# Patient Record
Sex: FEMALE | Race: WHITE | NOT HISPANIC OR LATINO | Employment: OTHER | URBAN - METROPOLITAN AREA
[De-identification: names, ages, dates, MRNs, and addresses within clinical notes are randomized per-mention and may not be internally consistent; named-entity substitution may affect disease eponyms.]

---

## 2017-01-17 ENCOUNTER — TRANSCRIBE ORDERS (OUTPATIENT)
Dept: MAMMOGRAPHY | Facility: HOSPITAL | Age: 62
End: 2017-01-17

## 2017-01-17 DIAGNOSIS — M81.0 OSTEOPOROSIS, UNSPECIFIED: Primary | ICD-10-CM

## 2017-01-17 DIAGNOSIS — Z12.31 VISIT FOR SCREENING MAMMOGRAM: ICD-10-CM

## 2017-02-21 ENCOUNTER — HOSPITAL ENCOUNTER (OUTPATIENT)
Dept: MAMMOGRAPHY | Facility: HOSPITAL | Age: 62
Discharge: HOME | End: 2017-02-21
Admitting: FAMILY MEDICINE

## 2017-02-21 ENCOUNTER — HOSPITAL ENCOUNTER (OUTPATIENT)
Dept: BONE DENSITY | Facility: HOSPITAL | Age: 62
Discharge: HOME | End: 2017-02-21

## 2017-02-21 DIAGNOSIS — Z12.31 VISIT FOR SCREENING MAMMOGRAM: ICD-10-CM

## 2017-02-21 DIAGNOSIS — M81.0 OSTEOPOROSIS, UNSPECIFIED: ICD-10-CM

## 2017-02-21 PROCEDURE — 77063 BREAST TOMOSYNTHESIS BI: CPT | Performed by: RADIOLOGY

## 2017-02-21 PROCEDURE — 77080 DXA BONE DENSITY AXIAL: CPT | Performed by: RADIOLOGY

## 2017-02-21 PROCEDURE — 77063 BREAST TOMOSYNTHESIS BI: CPT

## 2017-02-21 PROCEDURE — G0202 SCR MAMMO BI INCL CAD: HCPCS

## 2017-02-21 PROCEDURE — 77067 SCR MAMMO BI INCL CAD: CPT | Performed by: RADIOLOGY

## 2017-02-21 PROCEDURE — 77080 DXA BONE DENSITY AXIAL: CPT

## 2018-08-03 ENCOUNTER — TRANSCRIBE ORDERS (OUTPATIENT)
Dept: ADMINISTRATIVE | Facility: HOSPITAL | Age: 63
End: 2018-08-03

## 2018-08-03 DIAGNOSIS — Z12.31 VISIT FOR SCREENING MAMMOGRAM: Primary | ICD-10-CM

## 2018-08-22 ENCOUNTER — HOSPITAL ENCOUNTER (OUTPATIENT)
Dept: MAMMOGRAPHY | Facility: HOSPITAL | Age: 63
Discharge: HOME | End: 2018-08-22
Attending: FAMILY MEDICINE | Admitting: FAMILY MEDICINE

## 2018-08-22 DIAGNOSIS — Z12.31 VISIT FOR SCREENING MAMMOGRAM: ICD-10-CM

## 2018-08-22 PROCEDURE — 77063 BREAST TOMOSYNTHESIS BI: CPT

## 2018-08-22 PROCEDURE — 77067 SCR MAMMO BI INCL CAD: CPT

## 2018-08-22 PROCEDURE — 77067 SCR MAMMO BI INCL CAD: CPT | Performed by: RADIOLOGY

## 2018-08-22 PROCEDURE — 77063 BREAST TOMOSYNTHESIS BI: CPT | Performed by: RADIOLOGY

## 2018-12-27 ENCOUNTER — TRANSCRIBE ORDERS (OUTPATIENT)
Dept: ADMINISTRATIVE | Facility: HOSPITAL | Age: 63
End: 2018-12-27

## 2018-12-27 DIAGNOSIS — E04.9 ENLARGED THYROID: Primary | ICD-10-CM

## 2018-12-28 ENCOUNTER — HOSPITAL ENCOUNTER (OUTPATIENT)
Dept: ULTRASOUND IMAGING | Facility: HOSPITAL | Age: 63
Discharge: HOME | End: 2018-12-28
Admitting: NURSE PRACTITIONER

## 2018-12-28 DIAGNOSIS — E04.9 ENLARGED THYROID: ICD-10-CM

## 2018-12-28 PROCEDURE — 76536 US EXAM OF HEAD AND NECK: CPT

## 2019-03-19 ENCOUNTER — ANESTHESIA EVENT (OUTPATIENT)
Dept: PERIOP | Facility: HOSPITAL | Age: 64
End: 2019-03-19

## 2019-03-19 ENCOUNTER — OFFICE VISIT (OUTPATIENT)
Dept: ORTHOPEDIC SURGERY | Facility: CLINIC | Age: 64
End: 2019-03-19

## 2019-03-19 ENCOUNTER — APPOINTMENT (OUTPATIENT)
Dept: PREADMISSION TESTING | Facility: HOSPITAL | Age: 64
End: 2019-03-19

## 2019-03-19 VITALS — HEART RATE: 73 BPM | WEIGHT: 135 LBS | BODY MASS INDEX: 23.05 KG/M2 | HEIGHT: 64 IN | OXYGEN SATURATION: 99 %

## 2019-03-19 VITALS — WEIGHT: 137 LBS | BODY MASS INDEX: 23.39 KG/M2 | HEIGHT: 64 IN

## 2019-03-19 DIAGNOSIS — M25.572 LEFT ANKLE PAIN, UNSPECIFIED CHRONICITY: ICD-10-CM

## 2019-03-19 DIAGNOSIS — S82.853A CLOSED TRIMALLEOLAR FRACTURE OF ANKLE WITH HIGH FIBULAR FRACTURE: ICD-10-CM

## 2019-03-19 DIAGNOSIS — S82.839A CLOSED TRIMALLEOLAR FRACTURE OF ANKLE WITH HIGH FIBULAR FRACTURE: Primary | ICD-10-CM

## 2019-03-19 DIAGNOSIS — S82.853A CLOSED TRIMALLEOLAR FRACTURE OF ANKLE WITH HIGH FIBULAR FRACTURE: Primary | ICD-10-CM

## 2019-03-19 DIAGNOSIS — S82.839A CLOSED TRIMALLEOLAR FRACTURE OF ANKLE WITH HIGH FIBULAR FRACTURE: ICD-10-CM

## 2019-03-19 LAB
ANION GAP SERPL CALCULATED.3IONS-SCNC: 10 MMOL/L (ref 3–11)
APTT PPP: 30 SECONDS (ref 24–37)
BASOPHILS # BLD AUTO: 0.03 10*3/MM3 (ref 0–0.2)
BASOPHILS NFR BLD AUTO: 0.5 % (ref 0–1)
BUN BLD-MCNC: 26 MG/DL (ref 9–23)
BUN/CREAT SERPL: 33.8 (ref 7–25)
CALCIUM SPEC-SCNC: 9.6 MG/DL (ref 8.7–10.4)
CHLORIDE SERPL-SCNC: 106 MMOL/L (ref 99–109)
CO2 SERPL-SCNC: 27 MMOL/L (ref 20–31)
CREAT BLD-MCNC: 0.77 MG/DL (ref 0.6–1.3)
DEPRECATED RDW RBC AUTO: 46 FL (ref 37–54)
EOSINOPHIL # BLD AUTO: 0.09 10*3/MM3 (ref 0–0.3)
EOSINOPHIL NFR BLD AUTO: 1.4 % (ref 0–3)
ERYTHROCYTE [DISTWIDTH] IN BLOOD BY AUTOMATED COUNT: 14 % (ref 11.3–14.5)
GFR SERPL CREATININE-BSD FRML MDRD: 75 ML/MIN/1.73
GLUCOSE BLD-MCNC: 124 MG/DL (ref 70–100)
HCT VFR BLD AUTO: 38.6 % (ref 34.5–44)
HGB BLD-MCNC: 13 G/DL (ref 11.5–15.5)
IMM GRANULOCYTES # BLD AUTO: 0.02 10*3/MM3 (ref 0–0.05)
IMM GRANULOCYTES NFR BLD AUTO: 0.3 % (ref 0–0.6)
INR PPP: 0.97 (ref 0.85–1.16)
LYMPHOCYTES # BLD AUTO: 1.04 10*3/MM3 (ref 0.6–4.8)
LYMPHOCYTES NFR BLD AUTO: 16.4 % (ref 24–44)
MCH RBC QN AUTO: 30.4 PG (ref 27–31)
MCHC RBC AUTO-ENTMCNC: 33.7 G/DL (ref 32–36)
MCV RBC AUTO: 90.2 FL (ref 80–99)
MONOCYTES # BLD AUTO: 0.64 10*3/MM3 (ref 0–1)
MONOCYTES NFR BLD AUTO: 10.1 % (ref 0–12)
NEUTROPHILS # BLD AUTO: 4.54 10*3/MM3 (ref 1.5–8.3)
NEUTROPHILS NFR BLD AUTO: 71.3 % (ref 41–71)
PLATELET # BLD AUTO: 266 10*3/MM3 (ref 150–450)
PMV BLD AUTO: 8.5 FL (ref 6–12)
POTASSIUM BLD-SCNC: 3.9 MMOL/L (ref 3.5–5.5)
PROTHROMBIN TIME: 12.4 SECONDS (ref 11.2–14.3)
RBC # BLD AUTO: 4.28 10*6/MM3 (ref 3.89–5.14)
SODIUM BLD-SCNC: 143 MMOL/L (ref 132–146)
WBC NRBC COR # BLD: 6.36 10*3/MM3 (ref 3.5–10.8)

## 2019-03-19 PROCEDURE — 80048 BASIC METABOLIC PNL TOTAL CA: CPT | Performed by: ORTHOPAEDIC SURGERY

## 2019-03-19 PROCEDURE — 85730 THROMBOPLASTIN TIME PARTIAL: CPT | Performed by: ORTHOPAEDIC SURGERY

## 2019-03-19 PROCEDURE — 93005 ELECTROCARDIOGRAM TRACING: CPT

## 2019-03-19 PROCEDURE — 85025 COMPLETE CBC W/AUTO DIFF WBC: CPT | Performed by: ORTHOPAEDIC SURGERY

## 2019-03-19 PROCEDURE — 99204 OFFICE O/P NEW MOD 45 MIN: CPT | Performed by: ORTHOPAEDIC SURGERY

## 2019-03-19 PROCEDURE — 93010 ELECTROCARDIOGRAM REPORT: CPT | Performed by: INTERNAL MEDICINE

## 2019-03-19 PROCEDURE — 85610 PROTHROMBIN TIME: CPT | Performed by: ORTHOPAEDIC SURGERY

## 2019-03-19 PROCEDURE — 36415 COLL VENOUS BLD VENIPUNCTURE: CPT

## 2019-03-19 RX ORDER — LISINOPRIL 5 MG/1
TABLET ORAL
Refills: 1 | COMMUNITY
Start: 2019-02-11

## 2019-03-19 RX ORDER — ACETAMINOPHEN 325 MG/1
1000 TABLET ORAL ONCE
Status: CANCELLED | OUTPATIENT
Start: 2019-03-19 | End: 2019-03-19

## 2019-03-19 RX ORDER — FAMOTIDINE 10 MG/ML
20 INJECTION, SOLUTION INTRAVENOUS ONCE
Status: CANCELLED | OUTPATIENT
Start: 2019-03-19 | End: 2019-03-19

## 2019-03-19 RX ORDER — MELOXICAM 15 MG/1
15 TABLET ORAL EVERY MORNING
Refills: 3 | COMMUNITY
Start: 2019-03-06 | End: 2019-05-24

## 2019-03-19 RX ORDER — FLUOXETINE HYDROCHLORIDE 40 MG/1
CAPSULE ORAL
Refills: 1 | COMMUNITY
Start: 2019-02-10 | End: 2019-06-10

## 2019-03-19 RX ORDER — LEVOTHYROXINE SODIUM 0.05 MG/1
TABLET ORAL
Refills: 1 | COMMUNITY
Start: 2019-02-11 | End: 2019-12-16 | Stop reason: DRUGHIGH

## 2019-03-19 RX ORDER — ZOLPIDEM TARTRATE 10 MG/1
5 TABLET ORAL
Refills: 2 | COMMUNITY
Start: 2019-01-19

## 2019-03-19 RX ORDER — PRAVASTATIN SODIUM 20 MG
20 TABLET ORAL
Refills: 1 | COMMUNITY
Start: 2019-03-10

## 2019-03-19 NOTE — PROGRESS NOTES
Orthopaedic Clinic Note: ER New Patient    Chief Complaint   Patient presents with   • Left Ankle - Pain        HPI    Merry Tucker is a 63 y.o. female who presents with left ankle pain for 5 day(s). Onset began when she sustained a mechanical fall while in [place] for the [employer] men[employer] [place].  She was walking across the street and misstepped resulting in a sharp pain in her ankle.  She was unable to ambulate afterwards.  She was seen at [facility] and diagnosed with a trimalleolar ankle fracture.  She was instructed to follow-up with an orthopedic surgeon locally for evaluation and treatment.  She denies any fevers chills or constitutional symptoms.  She has been nonweightbearing as instructed.  She rates her pain a 1/10 on the pain scale currently and describes it as a constant ache localized to the ankle joint.    Past Medical History:   Diagnosis Date   • Anxiety    • High cholesterol    • Hypertension    • Hypothyroid       Past Surgical History:   Procedure Laterality Date   • ANKLE OPEN REDUCTION INTERNAL FIXATION Right 2003    orif   • AUGMENTATION MAMMAPLASTY Bilateral 1987    silicone       Family History   Problem Relation Age of Onset   • Cancer Mother    • Hypertension Mother    • Cancer Father    • Hypertension Father    • Heart attack Father    • Breast cancer Neg Hx    • Ovarian cancer Neg Hx      Social History     Socioeconomic History   • Marital status:      Spouse name: Not on file   • Number of children: Not on file   • Years of education: Not on file   • Highest education level: Not on file   Social Needs   • Financial resource strain: Not on file   • Food insecurity - worry: Not on file   • Food insecurity - inability: Not on file   • Transportation needs - medical: Not on file   • Transportation needs - non-medical: Not on file   Occupational History   • Not on file   Tobacco Use   • Smoking status: Never Smoker   • Smokeless tobacco: Never Used   Substance and  "Sexual Activity   • Alcohol use: Yes     Comment: social   • Drug use: No   • Sexual activity: Defer   Other Topics Concern   • Not on file   Social History Narrative   • Not on file      Current Outpatient Medications on File Prior to Visit   Medication Sig Dispense Refill   • CALCIUM CITRATE-VITAMIN D PO Take 1,000 mg by mouth.     • Cholecalciferol (VITAMIN D PO) Take  by mouth.     • FLUoxetine (PROzac) 40 MG capsule TAKE 1 CAPSULE BY MOUTH ONE TIME A DAY  1   • levothyroxine (SYNTHROID, LEVOTHROID) 50 MCG tablet TAKE 1 TABLET BY MOUTH IN THE MORNING on an empty stomach. do not eat for 30 minutes  1   • lisinopril (PRINIVIL,ZESTRIL) 5 MG tablet TAKE 1 TABLET BY MOUTH IN THE MORNING FOR BLOOD PRESSURE  1   • meloxicam (MOBIC) 15 MG tablet Take 15 mg by mouth Every Morning.  3   • pravastatin (PRAVACHOL) 20 MG tablet Take 20 mg by mouth every night at bedtime.  1   • zolpidem (AMBIEN) 10 MG tablet Take 5 mg by mouth every night at bedtime.  2     No current facility-administered medications on file prior to visit.       Allergies   Allergen Reactions   • Penicillins Rash        Review of Systems   Constitutional: Negative.    HENT: Positive for postnasal drip.    Eyes: Negative.    Respiratory: Negative.    Cardiovascular: Negative.    Gastrointestinal: Positive for constipation.   Endocrine: Negative.    Genitourinary: Negative.    Musculoskeletal: Positive for arthralgias and joint swelling.   Skin: Negative.    Allergic/Immunologic: Negative.    Neurological: Negative.    Hematological: Negative.    Psychiatric/Behavioral: Positive for decreased concentration and sleep disturbance. The patient is nervous/anxious.         The following portions of the patient's history were reviewed and updated as appropriate: allergies, current medications, past family history, past medical history, past social history, past surgical history and problem list.    Physical Exam  Pulse 73, height 162.6 cm (64.02\"), weight 61.2 kg " (135 lb), SpO2 99 %, not currently breastfeeding.    Body mass index is 23.16 kg/m².    GENERAL APPEARANCE: awake, alert & oriented x 3, in no acute distress and well developed, well nourished  PSYCH: normal affect  LUNGS:  breathing nonlabored  EYES: sclera anicteric  CARDIOVASCULAR: palpable dorsalis pedis, palpable posterior tibial bilaterally. Capillary refill less than 2 seconds  EXTREMITIES: no clubbing, cyanosis  GAIT: Not assessed            Left Lower Extremity Exam:    Left lower extremity short leg splint is clean, dry and intact.  Upon removal of the soft roll, there is focal swelling globally throughout the ankle with resolving ecchymosis.  She is focally tender to palpation globally throughout the ankle.  No open wounds or fracture blisters identified.  Ankle range of motion is limited secondary to pain.   Intact EHL, FHL, tibialis anterior, and gastrocsoleus. Sensation intact to light touch to deep peroneal, superficial peroneal, sural, saphenous, tibial nerves. Palpable DP and PT pulses. Edema -mild ankle swelling.    ______________________________________________________________________  ______________________________________________________________________    RADIOGRAPHIC FINDINGS:   Indication: Left ankle fracture    Comparison: Todays xrays were compared to previous xrays from [facility] from 3/14/2019     Left ankle 3 views: Radiographs demonstrate a mildly displaced trimalleolar ankle fracture was suspected syndesmotic injury      Assessment/Plan:   Diagnosis Plan   1. Closed trimalleolar fracture of ankle with high fibular fracture  Case Request    ceFAZolin (ANCEF) 2 g in sodium chloride 0.9 % 100 mL IVPB    acetaminophen (TYLENOL) tablet 975 mg    CBC and Differential    Basic metabolic panel    Protime-INR    APTT    Case Request   2. Left ankle pain, unspecified chronicity  XR Ankle 3+ View Left     The patient has clinical and radiographic evidence of unstable left ankle  trimalleolar ankle fracture which is severely restricting the patient's activities as well as quality of life. The recommendation at this time is to proceed with a open reduction internal fixation of left trimalleolar ankle fracture with the goal to improve patient function, decrease pain, and improve mobility. The risks, benefits, potential complications, and alternatives were discussed with the patient in detail. Risks included but were not limited to bleeding, infection, anesthesia risks, damage to neurovascular structures, nonunion, malunion, hardware failure, ankle arthritis, pain, continued pain, iatrogenic fracture, possible need for future surgery including the potential for amputation, blood clots, myocardial infarction, stroke, and death. Tessie-operative blood management and the potential for blood transfusion were discussed with risks and options clearly outlined. Specific details of the surgical procedure, hospitalization, recovery, rehabilitation, and long-term precautions were also presented. Pre-operative teaching was provided. Implant/prosthesis and equipment selection was outlined, and the many options available were explained; the final choice will be made at the time of the procedure to match the anatomy and condition of the bone, ligaments, tendons, and muscles. Given this instruction, the patient elected to proceed with the open reduction internal fixation of left ankle fracture. The patient will be seen by pre-admission testing for pre-operative optimization and risk assessment and will be scheduled for surgery once this is completed.      Ole Dominguez MD  03/19/19  8:13 AM

## 2019-03-19 NOTE — H&P (VIEW-ONLY)
Orthopaedic Clinic Note: ER New Patient    Chief Complaint   Patient presents with   • Left Ankle - Pain        HPI    Merry Tucker is a 63 y.o. female who presents with left ankle pain for 5 day(s). Onset began when she sustained a mechanical fall while in [place] for the [employer] men[place] basketball tournament.  She was walking across the street and misstepped resulting in a sharp pain in her ankle.  She was unable to ambulate afterwards.  She was seen at [facility] and diagnosed with a trimalleolar ankle fracture.  She was instructed to follow-up with an orthopedic surgeon locally for evaluation and treatment.  She denies any fevers chills or constitutional symptoms.  She has been nonweightbearing as instructed.  She rates her pain a 1/10 on the pain scale currently and describes it as a constant ache localized to the ankle joint.    Past Medical History:   Diagnosis Date   • Anxiety    • High cholesterol    • Hypertension    • Hypothyroid       Past Surgical History:   Procedure Laterality Date   • ANKLE OPEN REDUCTION INTERNAL FIXATION Right 2003    orif   • AUGMENTATION MAMMAPLASTY Bilateral 1987    silicone       Family History   Problem Relation Age of Onset   • Cancer Mother    • Hypertension Mother    • Cancer Father    • Hypertension Father    • Heart attack Father    • Breast cancer Neg Hx    • Ovarian cancer Neg Hx      Social History     Socioeconomic History   • Marital status:      Spouse name: Not on file   • Number of children: Not on file   • Years of education: Not on file   • Highest education level: Not on file   Social Needs   • Financial resource strain: Not on file   • Food insecurity - worry: Not on file   • Food insecurity - inability: Not on file   • Transportation needs - medical: Not on file   • Transportation needs - non-medical: Not on file   Occupational History   • Not on file   Tobacco Use   • Smoking status: Never Smoker   • Smokeless tobacco: Never Used   Substance and  "Sexual Activity   • Alcohol use: Yes     Comment: social   • Drug use: No   • Sexual activity: Defer   Other Topics Concern   • Not on file   Social History Narrative   • Not on file      Current Outpatient Medications on File Prior to Visit   Medication Sig Dispense Refill   • CALCIUM CITRATE-VITAMIN D PO Take 1,000 mg by mouth.     • Cholecalciferol (VITAMIN D PO) Take  by mouth.     • FLUoxetine (PROzac) 40 MG capsule TAKE 1 CAPSULE BY MOUTH ONE TIME A DAY  1   • levothyroxine (SYNTHROID, LEVOTHROID) 50 MCG tablet TAKE 1 TABLET BY MOUTH IN THE MORNING on an empty stomach. do not eat for 30 minutes  1   • lisinopril (PRINIVIL,ZESTRIL) 5 MG tablet TAKE 1 TABLET BY MOUTH IN THE MORNING FOR BLOOD PRESSURE  1   • meloxicam (MOBIC) 15 MG tablet Take 15 mg by mouth Every Morning.  3   • pravastatin (PRAVACHOL) 20 MG tablet Take 20 mg by mouth every night at bedtime.  1   • zolpidem (AMBIEN) 10 MG tablet Take 5 mg by mouth every night at bedtime.  2     No current facility-administered medications on file prior to visit.       Allergies   Allergen Reactions   • Penicillins Rash        Review of Systems   Constitutional: Negative.    HENT: Positive for postnasal drip.    Eyes: Negative.    Respiratory: Negative.    Cardiovascular: Negative.    Gastrointestinal: Positive for constipation.   Endocrine: Negative.    Genitourinary: Negative.    Musculoskeletal: Positive for arthralgias and joint swelling.   Skin: Negative.    Allergic/Immunologic: Negative.    Neurological: Negative.    Hematological: Negative.    Psychiatric/Behavioral: Positive for decreased concentration and sleep disturbance. The patient is nervous/anxious.         The following portions of the patient's history were reviewed and updated as appropriate: allergies, current medications, past family history, past medical history, past social history, past surgical history and problem list.    Physical Exam  Pulse 73, height 162.6 cm (64.02\"), weight 61.2 kg " (135 lb), SpO2 99 %, not currently breastfeeding.    Body mass index is 23.16 kg/m².    GENERAL APPEARANCE: awake, alert & oriented x 3, in no acute distress and well developed, well nourished  PSYCH: normal affect  LUNGS:  breathing nonlabored  EYES: sclera anicteric  CARDIOVASCULAR: palpable dorsalis pedis, palpable posterior tibial bilaterally. Capillary refill less than 2 seconds  EXTREMITIES: no clubbing, cyanosis  GAIT: Not assessed            Left Lower Extremity Exam:    Left lower extremity short leg splint is clean, dry and intact.  Upon removal of the soft roll, there is focal swelling globally throughout the ankle with resolving ecchymosis.  She is focally tender to palpation globally throughout the ankle.  No open wounds or fracture blisters identified.  Ankle range of motion is limited secondary to pain.   Intact EHL, FHL, tibialis anterior, and gastrocsoleus. Sensation intact to light touch to deep peroneal, superficial peroneal, sural, saphenous, tibial nerves. Palpable DP and PT pulses. Edema -mild ankle swelling.    ______________________________________________________________________  ______________________________________________________________________    RADIOGRAPHIC FINDINGS:   Indication: Left ankle fracture    Comparison: Todays xrays were compared to previous xrays from [facility] from 3/14/2019     Left ankle 3 views: Radiographs demonstrate a mildly displaced trimalleolar ankle fracture was suspected syndesmotic injury      Assessment/Plan:   Diagnosis Plan   1. Closed trimalleolar fracture of ankle with high fibular fracture  Case Request    ceFAZolin (ANCEF) 2 g in sodium chloride 0.9 % 100 mL IVPB    acetaminophen (TYLENOL) tablet 975 mg    CBC and Differential    Basic metabolic panel    Protime-INR    APTT    Case Request   2. Left ankle pain, unspecified chronicity  XR Ankle 3+ View Left     The patient has clinical and radiographic evidence of unstable left ankle  trimalleolar ankle fracture which is severely restricting the patient's activities as well as quality of life. The recommendation at this time is to proceed with a open reduction internal fixation of left trimalleolar ankle fracture with the goal to improve patient function, decrease pain, and improve mobility. The risks, benefits, potential complications, and alternatives were discussed with the patient in detail. Risks included but were not limited to bleeding, infection, anesthesia risks, damage to neurovascular structures, nonunion, malunion, hardware failure, ankle arthritis, pain, continued pain, iatrogenic fracture, possible need for future surgery including the potential for amputation, blood clots, myocardial infarction, stroke, and death. Tessie-operative blood management and the potential for blood transfusion were discussed with risks and options clearly outlined. Specific details of the surgical procedure, hospitalization, recovery, rehabilitation, and long-term precautions were also presented. Pre-operative teaching was provided. Implant/prosthesis and equipment selection was outlined, and the many options available were explained; the final choice will be made at the time of the procedure to match the anatomy and condition of the bone, ligaments, tendons, and muscles. Given this instruction, the patient elected to proceed with the open reduction internal fixation of left ankle fracture. The patient will be seen by pre-admission testing for pre-operative optimization and risk assessment and will be scheduled for surgery once this is completed.      Ole Dominguez MD  03/19/19  8:13 AM

## 2019-03-19 NOTE — PAT
Wipes not given to patient because ankle is wrapped.     Patient instructed to drink 20 ounces (or until full) of Gatorade or 20 ounces of G2 (if diabetic) and complete 1 hour before arrival time for procedure (NO RED Gatorade or G2)    Patient verbalized understanding. - Patient was given ERAS information from Dr. Dominguez's office.

## 2019-03-20 ENCOUNTER — HOSPITAL ENCOUNTER (OUTPATIENT)
Facility: HOSPITAL | Age: 64
Setting detail: HOSPITAL OUTPATIENT SURGERY
Discharge: HOME | End: 2019-03-20
Attending: ORTHOPAEDIC SURGERY | Admitting: ANESTHESIOLOGY

## 2019-03-20 ENCOUNTER — APPOINTMENT (OUTPATIENT)
Dept: OTHER | Facility: HOSPITAL | Age: 64
End: 2019-03-20

## 2019-03-20 ENCOUNTER — APPOINTMENT (OUTPATIENT)
Dept: GENERAL RADIOLOGY | Facility: HOSPITAL | Age: 64
End: 2019-03-20

## 2019-03-20 ENCOUNTER — ANESTHESIA (OUTPATIENT)
Dept: PERIOP | Facility: HOSPITAL | Age: 64
End: 2019-03-20

## 2019-03-20 VITALS
HEIGHT: 64 IN | OXYGEN SATURATION: 98 % | WEIGHT: 137 LBS | SYSTOLIC BLOOD PRESSURE: 134 MMHG | HEART RATE: 86 BPM | TEMPERATURE: 97.2 F | BODY MASS INDEX: 23.39 KG/M2 | RESPIRATION RATE: 16 BRPM | DIASTOLIC BLOOD PRESSURE: 84 MMHG

## 2019-03-20 DIAGNOSIS — Z00.6 EXAMINATION FOR NORMAL COMPARISON FOR CLINICAL RESEARCH: ICD-10-CM

## 2019-03-20 DIAGNOSIS — S82.839A CLOSED TRIMALLEOLAR FRACTURE OF ANKLE WITH HIGH FIBULAR FRACTURE: ICD-10-CM

## 2019-03-20 DIAGNOSIS — S82.853A CLOSED TRIMALLEOLAR FRACTURE OF ANKLE WITH HIGH FIBULAR FRACTURE: ICD-10-CM

## 2019-03-20 LAB — GLUCOSE BLDC GLUCOMTR-MCNC: 85 MG/DL (ref 70–130)

## 2019-03-20 PROCEDURE — 25010000002 BUPRENORPHINE PER 0.1 MG: Performed by: NURSE ANESTHETIST, CERTIFIED REGISTERED

## 2019-03-20 PROCEDURE — 25010000002 PROPOFOL 1000 MG/ML EMULSION: Performed by: NURSE ANESTHETIST, CERTIFIED REGISTERED

## 2019-03-20 PROCEDURE — 82962 GLUCOSE BLOOD TEST: CPT

## 2019-03-20 PROCEDURE — 25010000002 ROPIVACAINE PER 1 MG: Performed by: NURSE ANESTHETIST, CERTIFIED REGISTERED

## 2019-03-20 PROCEDURE — 25010000002 DEXAMETHASONE SODIUM PHOSPHATE 10 MG/ML SOLUTION: Performed by: NURSE ANESTHETIST, CERTIFIED REGISTERED

## 2019-03-20 PROCEDURE — 25010000002 PROPOFOL 10 MG/ML EMULSION: Performed by: NURSE ANESTHETIST, CERTIFIED REGISTERED

## 2019-03-20 PROCEDURE — 25010000002 DEXAMETHASONE PER 1 MG: Performed by: NURSE ANESTHETIST, CERTIFIED REGISTERED

## 2019-03-20 PROCEDURE — 76000 FLUOROSCOPY <1 HR PHYS/QHP: CPT

## 2019-03-20 PROCEDURE — C1713 ANCHOR/SCREW BN/BN,TIS/BN: HCPCS | Performed by: ORTHOPAEDIC SURGERY

## 2019-03-20 PROCEDURE — 25010000002 ONDANSETRON PER 1 MG: Performed by: NURSE ANESTHETIST, CERTIFIED REGISTERED

## 2019-03-20 PROCEDURE — C1769 GUIDE WIRE: HCPCS | Performed by: ORTHOPAEDIC SURGERY

## 2019-03-20 PROCEDURE — 25010000002 FENTANYL CITRATE (PF) 100 MCG/2ML SOLUTION: Performed by: NURSE ANESTHETIST, CERTIFIED REGISTERED

## 2019-03-20 PROCEDURE — 27829 TREAT LOWER LEG JOINT: CPT | Performed by: ORTHOPAEDIC SURGERY

## 2019-03-20 PROCEDURE — 27822 TREATMENT OF ANKLE FRACTURE: CPT | Performed by: ORTHOPAEDIC SURGERY

## 2019-03-20 DEVICE — BONE SCREW, FULLY THREADED,T10
Type: IMPLANTABLE DEVICE | Site: ANKLE | Status: FUNCTIONAL
Brand: VARIAX

## 2019-03-20 DEVICE — ONE-THIRD TUBULAR PLATE: Type: IMPLANTABLE DEVICE | Site: ANKLE | Status: FUNCTIONAL

## 2019-03-20 DEVICE — LOCKING SCREW
Type: IMPLANTABLE DEVICE | Site: ANKLE | Status: FUNCTIONAL
Brand: VARIAX

## 2019-03-20 DEVICE — CANNULATED SCREW
Type: IMPLANTABLE DEVICE | Site: ANKLE | Status: FUNCTIONAL
Brand: ASNIS

## 2019-03-20 DEVICE — BONE SCREW
Type: IMPLANTABLE DEVICE | Site: ANKLE | Status: FUNCTIONAL
Brand: VARIAX

## 2019-03-20 RX ORDER — PROMETHAZINE HYDROCHLORIDE 25 MG/1
25 TABLET ORAL ONCE AS NEEDED
Status: DISCONTINUED | OUTPATIENT
Start: 2019-03-20 | End: 2019-03-20 | Stop reason: HOSPADM

## 2019-03-20 RX ORDER — PROPOFOL 10 MG/ML
VIAL (ML) INTRAVENOUS AS NEEDED
Status: DISCONTINUED | OUTPATIENT
Start: 2019-03-20 | End: 2019-03-20 | Stop reason: SURG

## 2019-03-20 RX ORDER — SODIUM CHLORIDE 0.9 % (FLUSH) 0.9 %
3 SYRINGE (ML) INJECTION EVERY 12 HOURS SCHEDULED
Status: DISCONTINUED | OUTPATIENT
Start: 2019-03-20 | End: 2019-03-20 | Stop reason: HOSPADM

## 2019-03-20 RX ORDER — LIDOCAINE HYDROCHLORIDE 10 MG/ML
INJECTION, SOLUTION INFILTRATION; PERINEURAL AS NEEDED
Status: DISCONTINUED | OUTPATIENT
Start: 2019-03-20 | End: 2019-03-20 | Stop reason: SURG

## 2019-03-20 RX ORDER — SODIUM CHLORIDE 0.9 % (FLUSH) 0.9 %
3-10 SYRINGE (ML) INJECTION AS NEEDED
Status: DISCONTINUED | OUTPATIENT
Start: 2019-03-20 | End: 2019-03-20 | Stop reason: HOSPADM

## 2019-03-20 RX ORDER — CEFAZOLIN SODIUM 2 G/100ML
2 INJECTION, SOLUTION INTRAVENOUS ONCE
Status: DISCONTINUED | OUTPATIENT
Start: 2019-03-20 | End: 2019-03-20 | Stop reason: HOSPADM

## 2019-03-20 RX ORDER — FENTANYL CITRATE 50 UG/ML
50 INJECTION, SOLUTION INTRAMUSCULAR; INTRAVENOUS
Status: DISCONTINUED | OUTPATIENT
Start: 2019-03-20 | End: 2019-03-20 | Stop reason: HOSPADM

## 2019-03-20 RX ORDER — FAMOTIDINE 20 MG/1
20 TABLET, FILM COATED ORAL ONCE
Status: COMPLETED | OUTPATIENT
Start: 2019-03-20 | End: 2019-03-20

## 2019-03-20 RX ORDER — BUPRENORPHINE HYDROCHLORIDE 0.32 MG/ML
INJECTION INTRAMUSCULAR; INTRAVENOUS
Status: COMPLETED | OUTPATIENT
Start: 2019-03-20 | End: 2019-03-20

## 2019-03-20 RX ORDER — SODIUM CHLORIDE, SODIUM LACTATE, POTASSIUM CHLORIDE, CALCIUM CHLORIDE 600; 310; 30; 20 MG/100ML; MG/100ML; MG/100ML; MG/100ML
9 INJECTION, SOLUTION INTRAVENOUS CONTINUOUS
Status: DISCONTINUED | OUTPATIENT
Start: 2019-03-20 | End: 2019-03-20 | Stop reason: HOSPADM

## 2019-03-20 RX ORDER — EPHEDRINE SULFATE 50 MG/ML
5 INJECTION, SOLUTION INTRAVENOUS ONCE AS NEEDED
Status: DISCONTINUED | OUTPATIENT
Start: 2019-03-20 | End: 2019-03-20 | Stop reason: HOSPADM

## 2019-03-20 RX ORDER — MAGNESIUM HYDROXIDE 1200 MG/15ML
LIQUID ORAL AS NEEDED
Status: DISCONTINUED | OUTPATIENT
Start: 2019-03-20 | End: 2019-03-20 | Stop reason: HOSPADM

## 2019-03-20 RX ORDER — PROMETHAZINE HYDROCHLORIDE 25 MG/ML
6.25 INJECTION, SOLUTION INTRAMUSCULAR; INTRAVENOUS ONCE AS NEEDED
Status: DISCONTINUED | OUTPATIENT
Start: 2019-03-20 | End: 2019-03-20 | Stop reason: HOSPADM

## 2019-03-20 RX ORDER — OXYCODONE HYDROCHLORIDE AND ACETAMINOPHEN 5; 325 MG/1; MG/1
1 TABLET ORAL EVERY 4 HOURS PRN
Qty: 40 TABLET | Refills: 0 | Status: SHIPPED | OUTPATIENT
Start: 2019-03-20 | End: 2019-04-05

## 2019-03-20 RX ORDER — DEXAMETHASONE SODIUM PHOSPHATE 4 MG/ML
INJECTION, SOLUTION INTRA-ARTICULAR; INTRALESIONAL; INTRAMUSCULAR; INTRAVENOUS; SOFT TISSUE AS NEEDED
Status: DISCONTINUED | OUTPATIENT
Start: 2019-03-20 | End: 2019-03-20 | Stop reason: SURG

## 2019-03-20 RX ORDER — ONDANSETRON 2 MG/ML
INJECTION INTRAMUSCULAR; INTRAVENOUS AS NEEDED
Status: DISCONTINUED | OUTPATIENT
Start: 2019-03-20 | End: 2019-03-20 | Stop reason: SURG

## 2019-03-20 RX ORDER — BUPIVACAINE HYDROCHLORIDE 2.5 MG/ML
INJECTION, SOLUTION EPIDURAL; INFILTRATION; INTRACAUDAL
Status: COMPLETED | OUTPATIENT
Start: 2019-03-20 | End: 2019-03-20

## 2019-03-20 RX ORDER — DEXAMETHASONE SODIUM PHOSPHATE 10 MG/ML
INJECTION, SOLUTION INTRAMUSCULAR; INTRAVENOUS
Status: COMPLETED | OUTPATIENT
Start: 2019-03-20 | End: 2019-03-20

## 2019-03-20 RX ORDER — LIDOCAINE HYDROCHLORIDE 10 MG/ML
0.5 INJECTION, SOLUTION EPIDURAL; INFILTRATION; INTRACAUDAL; PERINEURAL ONCE AS NEEDED
Status: COMPLETED | OUTPATIENT
Start: 2019-03-20 | End: 2019-03-20

## 2019-03-20 RX ORDER — ACETAMINOPHEN 500 MG
1000 TABLET ORAL ONCE
Status: COMPLETED | OUTPATIENT
Start: 2019-03-20 | End: 2019-03-20

## 2019-03-20 RX ORDER — PROMETHAZINE HYDROCHLORIDE 25 MG/1
25 SUPPOSITORY RECTAL ONCE AS NEEDED
Status: DISCONTINUED | OUTPATIENT
Start: 2019-03-20 | End: 2019-03-20 | Stop reason: HOSPADM

## 2019-03-20 RX ORDER — ATROPINE SULFATE 1 MG/ML
0.5 INJECTION, SOLUTION INTRAMUSCULAR; INTRAVENOUS; SUBCUTANEOUS ONCE AS NEEDED
Status: DISCONTINUED | OUTPATIENT
Start: 2019-03-20 | End: 2019-03-20 | Stop reason: HOSPADM

## 2019-03-20 RX ORDER — FENTANYL CITRATE 50 UG/ML
INJECTION, SOLUTION INTRAMUSCULAR; INTRAVENOUS
Status: COMPLETED | OUTPATIENT
Start: 2019-03-20 | End: 2019-03-20

## 2019-03-20 RX ADMIN — SODIUM CHLORIDE, POTASSIUM CHLORIDE, SODIUM LACTATE AND CALCIUM CHLORIDE: 600; 310; 30; 20 INJECTION, SOLUTION INTRAVENOUS at 12:36

## 2019-03-20 RX ADMIN — PROPOFOL 100 MCG/KG/MIN: 10 INJECTION, EMULSION INTRAVENOUS at 12:22

## 2019-03-20 RX ADMIN — DEXAMETHASONE SODIUM PHOSPHATE 2 MG: 10 INJECTION INTRAMUSCULAR; INTRAVENOUS at 11:13

## 2019-03-20 RX ADMIN — PROPOFOL 100 MCG/KG/MIN: 10 INJECTION, EMULSION INTRAVENOUS at 12:55

## 2019-03-20 RX ADMIN — LIDOCAINE HYDROCHLORIDE 50 MG: 10 INJECTION, SOLUTION INFILTRATION; PERINEURAL at 12:16

## 2019-03-20 RX ADMIN — PROPOFOL 150 MG: 10 INJECTION, EMULSION INTRAVENOUS at 12:16

## 2019-03-20 RX ADMIN — LIDOCAINE HYDROCHLORIDE 0.5 ML: 10 INJECTION, SOLUTION EPIDURAL; INFILTRATION; INTRACAUDAL; PERINEURAL at 10:38

## 2019-03-20 RX ADMIN — ACETAMINOPHEN 1000 MG: 500 TABLET ORAL at 10:42

## 2019-03-20 RX ADMIN — EPHEDRINE SULFATE 10 MG: 50 INJECTION INTRAMUSCULAR; INTRAVENOUS; SUBCUTANEOUS at 12:27

## 2019-03-20 RX ADMIN — FAMOTIDINE 20 MG: 20 TABLET ORAL at 10:41

## 2019-03-20 RX ADMIN — SODIUM CHLORIDE, POTASSIUM CHLORIDE, SODIUM LACTATE AND CALCIUM CHLORIDE 9 ML/HR: 600; 310; 30; 20 INJECTION, SOLUTION INTRAVENOUS at 10:38

## 2019-03-20 RX ADMIN — ROPIVACAINE HYDROCHLORIDE 6 ML/HR: 5 INJECTION, SOLUTION EPIDURAL; INFILTRATION; PERINEURAL at 14:10

## 2019-03-20 RX ADMIN — EPHEDRINE SULFATE 10 MG: 50 INJECTION INTRAMUSCULAR; INTRAVENOUS; SUBCUTANEOUS at 12:22

## 2019-03-20 RX ADMIN — BUPIVACAINE HYDROCHLORIDE 20 ML: 2.5 INJECTION, SOLUTION EPIDURAL; INFILTRATION; INTRACAUDAL; PERINEURAL at 11:25

## 2019-03-20 RX ADMIN — ONDANSETRON 4 MG: 2 INJECTION INTRAMUSCULAR; INTRAVENOUS at 13:55

## 2019-03-20 RX ADMIN — EPHEDRINE SULFATE 10 MG: 50 INJECTION INTRAMUSCULAR; INTRAVENOUS; SUBCUTANEOUS at 12:25

## 2019-03-20 RX ADMIN — BUPRENORPHINE HYDROCHLORIDE 0.3 MG: 0.32 INJECTION INTRAMUSCULAR; INTRAVENOUS at 11:13

## 2019-03-20 RX ADMIN — FENTANYL CITRATE 100 MCG: 50 INJECTION, SOLUTION INTRAMUSCULAR; INTRAVENOUS at 11:13

## 2019-03-20 RX ADMIN — BUPIVACAINE HYDROCHLORIDE 15 ML: 2.5 INJECTION, SOLUTION EPIDURAL; INFILTRATION; INTRACAUDAL; PERINEURAL at 11:13

## 2019-03-20 RX ADMIN — DEXAMETHASONE SODIUM PHOSPHATE 8 MG: 4 INJECTION, SOLUTION INTRAMUSCULAR; INTRAVENOUS at 12:16

## 2019-03-20 NOTE — ANESTHESIA PROCEDURE NOTES
Peripheral Block      Patient reassessed immediately prior to procedure    Patient location during procedure: OR  Start time: 3/20/2019 11:13 AM  Stop time: 3/20/2019 11:19 AM  Reason for block: at surgeon's request and post-op pain management  Performed by  CRNA: Easton Montilla, CRNA  Assisted by: Ayla Anderson RN  Preanesthetic Checklist  Completed: patient identified, site marked, surgical consent, pre-op evaluation, timeout performed, IV checked, risks and benefits discussed and monitors and equipment checked  Prep:  Pt Position: supine  Sterile barriers:gloves, cap, sterile barriers and mask  Prep: ChloraPrep  Patient monitoring: blood pressure monitoring, continuous pulse oximetry and EKG  Procedure  Sedation:yes  Performed under: local infiltration  Guidance:ultrasound guided, nerve stimulator and landmark technique  Images:still images not obtained    Laterality:left  Block Type:femoral  Injection Technique:single-shot  Needle Type:short-bevel  Needle Gauge:20 G  Resistance on Injection: none  Catheter Size:20 G  Medications Used: bupivacaine PF (MARCAINE) 0.25 % injection, 15 mL  dexamethasone sodium phosphate injection, 2 mg  fentaNYL citrate (PF) (SUBLIMAZE) injection, 100 mcg  buprenorphine (BUPRENEX) injection, 0.3 mg  Med admintered at 3/20/2019 11:13 AM  Medications  Preservative Free Saline:5ml    Post Assessment  Injection Assessment: negative aspiration for heme, no paresthesia on injection and incremental injection  Patient Tolerance:comfortable throughout block  Complications:no  Additional Notes  The BBRaun 360 degree echogenic needle was introduced in plane, in a lateral to medial direction at the level of the inguinal crease.  Under ultrasound guidance, the femoral artery and vein where located.  The needle was then directed below Fascia Iliacus towards the Femoral nerve.  NS was utilized to hydro dissect and steffanie needle advancement towards the target structure.   LA was injected  incrementally in 3-5 ml aliquots with negative aspirate.  LA spread was visualized around the nerve, negative intraneural injection, low injection pressures.  Thank you

## 2019-03-20 NOTE — OP NOTE
OPERATIVE REPORT     DATE OF PROCEDURE: 3/20/2019    SURGEON: Ole Dominguez M.D.     ASSISTANT(S): Circulator: Tavares Hernández RN; Brandi Castro RN  Radiology Technologist: Terry Yarbrough RT  Scrub Person: Tay Estrada  Nursing Assistant: Mirela Perez Jason B     PREOPERATIVE DIAGNOSIS: Left trimalleolar ankle fracture    POSTOPERATIVE DIAGNOSIS: same plus syndesmotic disruption    PROCEDURE: Open reduction internal fixation of left trimalleolar ankle fracture with fixation of medial and lateral malleoli.  Stabilization of distal tib- fib syndesmosis    SURGICAL DETAILS:     APPROACH: Anterior medial, direct lateral    ANESTHESIA: General plus regional block    PREOPERATIVE ANTIBIOTICS: Ancef 2 g IV    ESTIMATED BLOOD LOSS: 20 cc     TOURNIQUET TIME: 60 min @300 mmHg     SPECIMENS: None    IMPLANTS: Medial side: 4.0 mm partially-threaded cannulated screws x2; lateral side: 2.7 mm interfragmentary lag screw x1, 8 hole [place] one third tubular plate with 4 nonlocking 3.5 mm cortical screws and 2 locking 3.5 millimeter screws    DRAINS: None    LOCAL INJECTION: None    MODIFIER(S): None    COMPLICATIONS: None apparent    INDICATIONS FOR PROCEDURE: Ms. Tucker is a 63-year-old female who sustained a mechanical fall resulting in his trimalleolar ankle fracture.  She is based on the fracture pattern, surgical fixation was recommended.  The possibility of syndesmotic baseline ambulatory without assistive device.  Injury was also discussed but would best be determined intraoperatively.  It may require syndesmotic screw fixation.  Nonoperative treatment will result in ankle arthritis due to malunion or nonunion resulting in decreased function.  Based on these treatment options, patient elected to proceed with surgery.  The risks, benefits, alternatives, and potential complications of the this surgery were discussed with the patient in detail to include but not limited to infection,  bleeding, anesthesia risks, nerve injury, vessel injury, pain, blood clots, possible need for blood transfusion, possible need for further procedures, myocardial infarction, stroke, and death.  Specific risks of the surgery include recurrent injury, accelerated arthritis, loss of fixation. Specific details of the procedure, hospitalization, recovery, rehabilitation, and long-term precautions were also provided. Pre-operative teaching was provided. Surgical device selection was outlined, and the many options available were explained; final choices will be made at the time of the procedure to match the anatomy and condition of the bone, and soft tissue structures. Understanding of all topics was conveyed to me by the patient, and consent was given to proceed with a operative fixation of left ankle fracture.     INTRAOPERATIVE FINDINGS: Unstable trimalleolar ankle fracture with syndesmotic disruption    PROCEDURE: The patient was identified in the preoperative holding area. The operative site was confirmed and marked. A sequential compression device was placed on the nonoperative leg. The risks, benefits, and alternatives to surgery were again confirmed with the patient and the patient wished to proceed. The patient was brought to the operating room and placed on the operating room table in the supine position. A huddle was performed with the patient and all vital surgical team members to confirm the correct operative site, procedure, anesthesia type, and operative plan with the patient. After anesthesia was performed, the patient was positioned in the supine position. All bony prominences were padded.  A bump was placed under the operative extremity.  A nonsterile tourniquet was placed on the operative thigh.  A bone foam ramp was placed under the operative leg.. Intravenous antibiotic prophylaxis was given and confirmed with the anesthesia team. The operative extremity was prepped and draped in the usual sterile  fashion. A surgical time out was performed immediately preceding the incision with all personnel in the operating room to confirm patient identity, the correct operative site and extremity, correct radiographic studies, availability of appropriate surgical equipment and agreement on the planned procedure.     The extremity was elevated and exsanguinated using an Esmarch.  Tourniquet was inflated.  Attention was turned to the medial malleolar fracture fragment where an anterior medial incision was made full-thickness through skin and subcutaneous tissue down to the level of the periosteum.  The anterior shoulder the fracture was identified.  Soft tissue and periosteum was removed from the fracture site in order to achieve a cortical read.  Using a pointed reduction clamp, anatomic reduction of the fracture was held in place while 2 K wires were placed perpendicular to the fracture across the fracture fragment from distal to proximal.  Fluoroscopic imaging in orthogonal views verified appropriate placement of the wires as well as anatomic reduction of the fracture.  This was then followed by placement of 2 partially threaded 4.0 mm cannulated screws.      Next, attention was turned to the lateral malleolar fracture site where a direct lateral incision was made over the lateral malleolus extending proximally.  Full-thickness tissue flaps were created down to the periosteum.  The oblique fracture was identified and cleaned.  Pointed reduction clamps were used to achieve anatomic reduction using the cortical reads.  With the reduction verified under fluoroscopic imaging, one2.7 mm lag screw was placed from anterior to posterior using a lag by technique to compress the fracture fragments together.  Next, the appropriately sized plate was contoured to the lateral malleolus.  Provisional placement on the distal fibula was achieved with K wires and fluoroscopic imaging verified appropriate hardware placement on the fibular  bone.  Next, the plate was secured to bone using a neutralization technique.  A total of 3 nonlocking screws were placed in the proximal 3 holes of the plate and 2 locking screws were placed in the distal most holes of the plate in unicortical fashion.  Next, a cotton test was performed and verified syndesmotic disruption with widening.  Therefore, the third from distal hole was utilized to place a syndesmotic screw.  Using digital reduction with the foot in a dorsiflexed position, the syndesmosis was held in place while a syndesmotic screw was placed parallel to the joint across the distal tibia/fibula articulation.  Excellent purchase of the screw was obtained.  Subsequent cotton test revealed the syndesmosis was stable with a screw in place.    Satisfied with this, final fluoroscopic imaging including AP, oblique, and lateral views were obtained.  Imaging verified anatomic reduction of the medial lateral malleolar fracture fragments, asymmetric ankle mortise, and no evidence of anterior posterior subluxation of the talus relative to the tibial plafond.  The posterior malleolar fragment was well positioned and aligned to its native position and therefore fixation was not performed.    Next, the tourniquet was then deflated and hemostasis obtained with electrocautery.  The wound was then closed in layered fashion using 2-0 Vicryl for the subcutaneous layer and 3-0 nylon for skin.  Sterile dressings were then applied followed by placement of a short leg plaster splint.  The patient was awakened from anesthesia and transferred to the PACU in stable condition.    No apparent complications occurred during the procedure Instrument, sponge and needle counts were correct x 2.     POST OPERATIVE PLAN:   Weight Bearing: Nonweightbearing left lower extremity times 3 months  DVT prophylaxis: No DVT prophylaxis necessary  Therapy/Activity: Keep splint clean, dry and intact until follow-up appointment. Mobilization with  crutches/rolling knee walker as needed  Wound Care: Splint to remain clean, dry and intact until follow-up appointment.  Pain control: Percocet 5 for pain control  Follow up: Angelica 2 weeks for wound check and suture removal

## 2019-03-20 NOTE — ANESTHESIA POSTPROCEDURE EVALUATION
Patient: Merry Tucker    Procedure Summary     Date:  03/20/19 Room / Location:   [facility]  [facility]    Anesthesia Start:  1211 Anesthesia Stop:      Procedure:  LEFT ANKLE OPEN REDUCTION INTERNAL FIXATION (Left Ankle) Diagnosis:       Closed trimalleolar fracture of ankle with high fibular fracture      (Closed trimalleolar fracture of ankle with high fibular fracture [S82.853A, S82.839A])    Surgeon:  Ole Dominguez MD Provider:  Ruy Cedillo MD    Anesthesia Type:  general with block ASA Status:  3          Anesthesia Type: general with block  Last vitals  BP   116/70 (03/20/19 1424)   Temp   97.4 °F (36.3 °C) (03/20/19 1424)   Pulse   100 (03/20/19 1424)   Resp   20 (03/20/19 1424)     SpO2   99 % (03/20/19 1424)     Post Anesthesia Care and Evaluation    Patient location during evaluation: PACU  Patient participation: complete - patient cannot participate  Level of consciousness: responsive to verbal stimuli  Pain score: 0  Pain management: adequate  Airway patency: patent  Anesthetic complications: No anesthetic complications    Cardiovascular status: stable  Respiratory status: acceptable, nasal cannula and spontaneous ventilation  Hydration status: stable    Comments: Pt transferred to PACU with O2. Vital signs stable. Report to PACU RN and care accepted.

## 2019-03-20 NOTE — NURSING NOTE
Acute Pain Service:  Peripheral nerve catheter and disposable infusion device teaching completed with patient and spouse.  Discussion, handout and bracelet provided with [facility] on call central phone number.  Instructed to call with any questions or concerns.  Patient verbalized understanding.  Service will continue to follow until catheter DC'd.  Please contact patient at [phone] if needed.

## 2019-03-20 NOTE — ANESTHESIA PROCEDURE NOTES
Peripheral Block      Patient reassessed immediately prior to procedure    Patient location during procedure: pre-op  Reason for block: at surgeon's request and post-op pain management  Performed by  CRNA: Easton Montilla CRNA  Assisted by: Ayla Anderson RN  Preanesthetic Checklist  Completed: patient identified, site marked, surgical consent, pre-op evaluation, timeout performed, IV checked, risks and benefits discussed and monitors and equipment checked  Prep:  Pt Position: right lateral decubitus  Sterile barriers:cap, gloves, mask and sterile barriers  Prep: ChloraPrep  Patient monitoring: blood pressure monitoring, continuous pulse oximetry and EKG  Procedure  Sedation:no  Performed under: local infiltration  Guidance:ultrasound guided  Images:still images obtained    Laterality:left  Block Type:popliteal  Injection Technique:catheter  Needle Type:echogenic  Needle Gauge:18 G  Resistance on Injection: none  Catheter Size:20 G  Cath Depth at skin: 8 cm  Medications Used: bupivacaine PF (MARCAINE) 0.25 % injection, 20 mL  Med admintered at 3/20/2019 11:25 AM  Medications  Preservative Free Saline:5ml    Post Assessment  Injection Assessment: negative aspiration for heme, no paresthesia on injection and incremental injection  Patient Tolerance:comfortable throughout block  Complications:no  Additional Notes  Procedure:                                                         The pt was placed in  lateral position.  The Insertion site was  prepped and Draped in sterile fashion.  The pt was anesthetized with  IV Sedation( see meds).  Skin and cutaneous tissue was infiltrated and anesthetized with 1% Lidocaine 3 mls via a 25g needle.  A BBraun 4 inch 18g echogenic needle was then  inserted approximately 3 cm proximal to the popliteal dustin a at the lateral mid biceps femoris and advanced In-plane with Ultrasound guidance.  Normal Saline PSF was utilized for hydrodissection of tissue.  The popliteal artery was  visualized and the common peroneal and tibial bifurcation was located.  LA injection spread was visualized, injection was incremental 1-5ml, injection pressure was normal or little, no intraneural injection, no vascular injection.  .  A BBraun 20g wire stylet catheter was placed via the needle with ultrasound visualization and confirmation with NS fluid bolus. The labeled Catheter was then secured at insertions site with skin afix,  mastisol, steristrips  and a CHG transparent dressing was placed over. Thank you

## 2019-03-20 NOTE — INTERVAL H&P NOTE
"Pre-Op H&P (See Recent Office Note Attached for Full H&P)    History and physical note from office reviewed and updated with the following, otherwise there are no changes in H&P:    Vital Signs:  /83 (BP Location: Right arm, Patient Position: Lying)   Pulse 70   Temp 97.4 °F (36.3 °C) (Temporal)   Resp 18   Ht 162.6 cm (64\")   Wt 62.1 kg (137 lb)   SpO2 98%   BMI 23.52 kg/m²       GERRY Macias  3/20/2019   11:53 AM    "

## 2019-03-20 NOTE — BRIEF OP NOTE
ANKLE OPEN REDUCTION INTERNAL FIXATION  Progress Note    Merry Tucker  3/20/2019    Pre-op Diagnosis:   Closed trimalleolar fracture of ankle with high fibular fracture [S82.853A, S82.839A]       Post-Op Diagnosis Codes:     * Closed trimalleolar fracture of ankle with high fibular fracture [S82.853A, S82.839A]    Procedure/CPT® Codes:  WI OPEN TX TRIMALLEOLAR ANKLE FX W/O FIX PST LIP [85859]  WI OPEN TX DISTAL TIBIOFIBULAR JOINT DISRUPTION [78228]    Procedure(s):  LEFT ANKLE OPEN REDUCTION INTERNAL FIXATION    Surgeon(s):  Ole Dominguez MD    Anesthesia: General with Block    Staff:   Circulator: Tavares Hernández RN; Brandi Castro RN  Radiology Technologist: Terry Yarbrough RT  Scrub Person: Tay Estrada  Nursing Assistant: Mirela Perez; David Guillen    Estimated Blood Loss: 100 mL    Urine Voided: * No values recorded between 3/20/2019 12:09 PM and 3/20/2019  2:06 PM *    Specimens:                None          Drains:      Findings: Trimalleolar ankle fracture with syndesmotic disruption    Complications: None apparent      Ole Dominguez MD     Date: 3/20/2019  Time: 2:19 PM

## 2019-03-20 NOTE — DISCHARGE INSTRUCTIONS
"DISCHARGE INSTRUCTIONS   Dr. Dominguez     Operative fixation left ankle fracture    Wound Care   1) Keep wound / incision area clean and dry.   2) Dressing to remain in place until post-operative day 7. If a splint or cast is present, leave in place until next appointment. Upon dressing removal (if no splint/cast present), assess for wound drainage. If no drainage is present, keep wound / incision area open to air as much as possible. If drainage is present, place sterile dressing to cover wound and assess daily. If drainage continues to occur after post-operative day 10, call the office for an urgent appointment. (You should be seen in the clinic within 1-2 days of calling).   3) No baths or swimming until otherwise instructed. The wound must remain dry for 10 days after surgery. After 10 days, you may begin to shower only if no drainage is present. No submerging the wound under standing water until cleared by your physician (no baths, hot tubs, swimming pools, etc). Sponge baths are the best way to perform personal hygiene while at the same time protecting the wound from moisture. If splint or cast is present, do not allow it to get wet.   4) Prior to showering, the wound must remain dry for 72 consecutive hours (no drainage whatsoever) prior to showering. If the wound drains or spots, the clock \"resets\" - make sure the wound has been drainage-free for 72 consecutive hours.   5) Once you are allowed to get the wound wet, please use gentle soap to wash the wound area. DO NOT aggressively scrub the wound with a washcloth or bath sponge. Please visually inspect your wound(s) at least once daily. If the wound(s) are in a difficult to see location, please use a mirror or have someone else assist with visual inspection.   6) No scrubbing the wound. You may \"pad dry\" the wound, but do not rub, as this may open up the wound and pre-dispose to wound infection.   7) Do not apply lotions or creams to incision site, unless " "instructed otherwise.   8) Observe for redness, swelling, or drainage. Please call the clinic immediately if you have fevers, chills with warmth/redness surrounding wound site or if you notice pus drainage from the wound site     Activity   No heavy lifting objects greater than 10 pounds.   No driving while on narcotic pain medication.   No submerging wound under standing water (pool, bath tub, etc.) until otherwise instructed.   You may be nonweightbearing on your operative (left lower) extremity   Use crutches or a walker for ambulation as instructed.   Elevate extremity as much as possible to minimize swelling     Blood Clot Prophylaxis   No chemoprophylaxis necessary for blood clot prevention.  You may take an 81 mg aspirin if desired    Discharge Pain Medications   You will be given a prescription for pain medication. You should start taking this the same day after your surgery. Wean off as tolerated. Do not wait to take the pain medication until the pain is severe, as it will be difficult to \"catch up\" once this occurs. The pain medication usually reaches its full effect ~1 hour after ingesting. If you have been sent home on Valium, this medication works well for muscle spasms. If you have been sent home on Colace, this medication should be taken until you are off all narcotic (i.e. Norco, Percocet, Oxycodone, etc) pain medications, in order to prevent constipation. Percocet or Norco have Tylenol in their ingredient lists. You must be careful not to exceed 4,000mg (4 grams) of Tylenol, from all sources, within a single 24-hr period. This means that you may not take more than 12 Percocets or Norcos within a 24-hr period. Do NOT take Regular or Extra Strength Tylenol when taking your Percocet or Norco medications. Some common side effects of the narcotic pain medications (Percocet, Oxycodone, Norco, etc) include nausea and itching. Benadryl is a great over the counter medication that helps calm your stomach, " decreases your anxiety levels, and minimizes the itching. You can easily purchase this at your local pharmacy as an over-the-counter medication. Please abide by the instructions as printed on the bottle. If your nausea persists, make sure to take small amounts of crackers or other lighter foods.     Follow-Up   Follow-up with Dr. Dominguez's office in 2 weeks from the surgery date for a post-operative evaluation. Have the following xrays done upon arrival to the follow-up appointment: 3 views left ankle out of splint. Please call Dr. Dominguez's office at [phone] for orthopaedic appointments or questions.

## 2019-03-21 NOTE — PROGRESS NOTES
FABIOLA Garcia    Nerve Cath Post Op Call    Patient Name: Merry Tucker  :  1955  MRN:  [id number]  Date of Discharge: 3/20/2019    Nerve Cath Post Op Call:    Analgesia:Excellent  Pain Score:0/10  Side Effects:Excessive Motor Block  Catheter Site:clean  Patient Controlled ON Q pump infusion rate: 4ml/hr  Catheter Plan:Will continue with plan at home without changes and The patient was instructed to call ON CALL Anesthesia provider for any questions or problems                  Decreased to 4 ml/hr due to pt unable to move toes.

## 2019-03-24 NOTE — PROGRESS NOTES
FABIOLA Garcia    Nerve Cath Post Op Call    Patient Name: Merry Tucker  :  1955  MRN:  [id number]  Date of Discharge: 3/20/2019    Nerve Cath Post Op Call:    Catheter Plan:Patient called/No answer/Message left to call [facility] pain service for any questions or complaints

## 2019-03-25 NOTE — PROGRESS NOTES
FABIOLA Garcia    Nerve Cath Post Op Call    Patient Name: Merry Tucker  :  1955  MRN:  [id number]  Date of Discharge: 3/20/2019    Nerve Cath Post Op Call:    Catheter Plan:Patient/Family member report nerve catheter previously discontinued, tip intact

## 2019-04-05 ENCOUNTER — OFFICE VISIT (OUTPATIENT)
Dept: ORTHOPEDIC SURGERY | Facility: CLINIC | Age: 64
End: 2019-04-05

## 2019-04-05 DIAGNOSIS — Z98.890 S/P ORIF (OPEN REDUCTION INTERNAL FIXATION) FRACTURE: Primary | ICD-10-CM

## 2019-04-05 DIAGNOSIS — Z87.81 S/P ORIF (OPEN REDUCTION INTERNAL FIXATION) FRACTURE: Primary | ICD-10-CM

## 2019-04-05 PROCEDURE — 99024 POSTOP FOLLOW-UP VISIT: CPT | Performed by: ORTHOPAEDIC SURGERY

## 2019-04-05 NOTE — PROGRESS NOTES
Orthopaedic Clinic Note:  Post Op    Chief Complaint   Patient presents with   • Post-op     2 weeks status post Left Ankle Open Reduction Internal Fixation 03/20/2019        HPI    Ms. Tucker is 2  week(s) s/p open reduction internal fixation left ankle fracture. Rates pain 3/10. She is ambulating with rolling knee walker and has been nonweightbearing left lower extremity as instructed.  She has been taking Tylenol for pain.  She denies fevers chills or constitutional symptoms.  Overall her pain is improving.     Past Medical History:   Diagnosis Date   • Anxiety    • Cancer (CMS/HCC)     squamous skin cancer removed    • Cataracts, bilateral    • Diverticulitis    • High cholesterol    • Hypertension    • Hypothyroid    • IBS (irritable bowel syndrome)    • PONV (postoperative nausea and vomiting)    • Stress incontinence    • Wears glasses       Past Surgical History:   Procedure Laterality Date   • ANKLE OPEN REDUCTION INTERNAL FIXATION Right 2003    orif   • ANKLE OPEN REDUCTION INTERNAL FIXATION Left 3/20/2019    Procedure: ANKLE OPEN REDUCTION INTERNAL FIXATION LEFT;  Surgeon: Ole Dominguez MD;  Location: [facility];  Service: Orthopedics   • AUGMENTATION MAMMAPLASTY Bilateral 1987    silicone    • COLONOSCOPY     • COSMETIC SURGERY      tummy tuck and facelift      Social History     Socioeconomic History   • Marital status:      Spouse name: Not on file   • Number of children: Not on file   • Years of education: Not on file   • Highest education level: Not on file   Tobacco Use   • Smoking status: Never Smoker   • Smokeless tobacco: Never Used   Substance and Sexual Activity   • Alcohol use: Yes     Comment: social   • Drug use: No   • Sexual activity: Defer      Current Outpatient Medications on File Prior to Visit   Medication Sig Dispense Refill   • CALCIUM CITRATE-VITAMIN D PO Take 1,000 mg by mouth.     • Cholecalciferol (VITAMIN D PO) Take  by mouth.     • FLUoxetine (PROzac) 40 MG  capsule TAKE 1 CAPSULE BY MOUTH ONE TIME A DAY  1   • levothyroxine (SYNTHROID, LEVOTHROID) 50 MCG tablet TAKE 1 TABLET BY MOUTH IN THE MORNING on an empty stomach. do not eat for 30 minutes  1   • lisinopril (PRINIVIL,ZESTRIL) 5 MG tablet TAKE 1 TABLET BY MOUTH IN THE MORNING FOR BLOOD PRESSURE  1   • meloxicam (MOBIC) 15 MG tablet Take 15 mg by mouth Every Morning.  3   • pravastatin (PRAVACHOL) 20 MG tablet Take 20 mg by mouth every night at bedtime.  1   • zolpidem (AMBIEN) 10 MG tablet Take 5 mg by mouth every night at bedtime. Patient states she takes 1/3 of 10 mg tab  2   • [DISCONTINUED] oxyCODONE-acetaminophen (PERCOCET) 5-325 MG per tablet Take 1 tablet by mouth Every 4 (Four) Hours As Needed (Pain). 40 tablet 0     No current facility-administered medications on file prior to visit.       Allergies   Allergen Reactions   • Penicillins Rash        Review of Systems     Physical Exam  not currently breastfeeding.    There is no height or weight on file to calculate BMI.    GENERAL APPEARANCE: awake, alert, oriented, in no acute distress and well developed, well nourished  LUNGS:  breathing nonlabored  EXTREMITIES: no clubbing, cyanosis    Left Lower Extremity Exam:    Medial lateral based incisions at the ankle are healing well with no erythema or drainage.  Sutures are in place.  There is focal swelling about the ankle consistent with postoperative changes.  Ankle range of motion reveals 5 degrees dorsiflexion and 10 degrees plantarflexion.  She has some trace tenderness to palpation about the medial lateral hardware.  Palpable dorsalis pedis and posterior tibial pulses.  SENSATION TO LIGHT TOUCH:  DEEP PERONEAL/SUPERFICIAL PERONEAL/SURAL/SAPHENOUS/TIBIAL:   intact    EDEMA:  no  ERYTHEMA:  no  WOUNDS/INCISIONS:  yes, well-healed medial lateral incisions  _______________________________________________________________  _______________________________________________________________    RADIOGRAPHIC  FINDINGS:   Indication: Status post open reduction internal fixation left ankle fracture    Comparison: Todays xrays were compared to previous xrays from 3/19/2019    Left ankle 3 views: Radiographs demonstrate interval fixation of the trimalleolar ankle fracture with excellent anatomic reduction.  Hardware is in place without evidence of hardware complication.  Ankle mortise is symmetric with syndesmotic screw fixation    Assessment/Plan:   Diagnosis Plan   1. S/P ORIF (open reduction internal fixation) fracture  XR Ankle 3+ View Left     At this point we will discontinue sutures and Place Steri-Strips over the incisions.  She is to come out of the splint immobilization and into a removable boot.  She will work on ankle range of motion out of the boot a couple of times a day.  And she will then go back into the boot.  She is to remain nonweightbearing for a total of 3 months to allow for syndesmotic healing.  At the 3-month period, we will remove the syndesmotic screw before initiating weightbearing.  Follow-up 4 weeks with repeat x-ray 3 views left ankle.    Bere Randall MA  04/05/19  11:25 AM

## 2019-04-22 ENCOUNTER — TELEPHONE (OUTPATIENT)
Dept: ORTHOPEDIC SURGERY | Facility: CLINIC | Age: 64
End: 2019-04-22

## 2019-04-22 NOTE — TELEPHONE ENCOUNTER
The boot is additional support. She does not have to wear at night. She should wear during the day except when coming out of the boot for range of motion exercises.

## 2019-05-03 ENCOUNTER — OFFICE VISIT (OUTPATIENT)
Dept: ORTHOPEDIC SURGERY | Facility: CLINIC | Age: 64
End: 2019-05-03

## 2019-05-03 DIAGNOSIS — Z87.81 S/P ORIF (OPEN REDUCTION INTERNAL FIXATION) FRACTURE: Primary | ICD-10-CM

## 2019-05-03 DIAGNOSIS — Z98.890 S/P ORIF (OPEN REDUCTION INTERNAL FIXATION) FRACTURE: Primary | ICD-10-CM

## 2019-05-03 PROCEDURE — 99024 POSTOP FOLLOW-UP VISIT: CPT | Performed by: ORTHOPAEDIC SURGERY

## 2019-05-03 RX ORDER — ERGOCALCIFEROL 1.25 MG/1
50000 CAPSULE ORAL WEEKLY
Refills: 0 | COMMUNITY
Start: 2019-04-12

## 2019-05-03 RX ORDER — CEPHALEXIN 500 MG/1
500 CAPSULE ORAL EVERY 6 HOURS
Qty: 28 CAPSULE | Refills: 0 | Status: SHIPPED | OUTPATIENT
Start: 2019-05-03 | End: 2019-05-24

## 2019-05-03 NOTE — PROGRESS NOTES
Orthopaedic Clinic Note:  Post Op    Chief Complaint   Patient presents with   • Post-op     4 week f/u- 6 weeks status post Left Ankle Open Reduction Internal Fixation 03/20/2019        HPI    Ms. Tucker is 6  week(s) s/p operative fixation left ankle fracture with syndesmotic fixation.  She rates her pain 1/10 on the pain scale.  She has been nonweightbearing as instructed.  She denies any complications at this time.  She is taking Tylenol for pain control.  Overall she is doing much better.  Past Medical History:   Diagnosis Date   • Anxiety    • Cancer (CMS/HCC)     squamous skin cancer removed    • Cataracts, bilateral    • Diverticulitis    • High cholesterol    • Hypertension    • Hypothyroid    • IBS (irritable bowel syndrome)    • PONV (postoperative nausea and vomiting)    • Stress incontinence    • Wears glasses       Past Surgical History:   Procedure Laterality Date   • ANKLE OPEN REDUCTION INTERNAL FIXATION Right 2003    orif   • ANKLE OPEN REDUCTION INTERNAL FIXATION Left 3/20/2019    Procedure: ANKLE OPEN REDUCTION INTERNAL FIXATION LEFT;  Surgeon: Ole Dominguez MD;  Location: [facility];  Service: Orthopedics   • AUGMENTATION MAMMAPLASTY Bilateral 1987    silicone    • COLONOSCOPY     • COSMETIC SURGERY      tummy tuck and facelift      Social History     Socioeconomic History   • Marital status:      Spouse name: Not on file   • Number of children: Not on file   • Years of education: Not on file   • Highest education level: Not on file   Tobacco Use   • Smoking status: Never Smoker   • Smokeless tobacco: Never Used   Substance and Sexual Activity   • Alcohol use: Yes     Comment: social   • Drug use: No   • Sexual activity: Defer      Current Outpatient Medications on File Prior to Visit   Medication Sig Dispense Refill   • CALCIUM CITRATE-VITAMIN D PO Take 1,000 mg by mouth.     • Cholecalciferol (VITAMIN D PO) Take  by mouth.     • FLUoxetine (PROzac) 40 MG capsule TAKE 1 CAPSULE BY  MOUTH ONE TIME A DAY  1   • levothyroxine (SYNTHROID, LEVOTHROID) 50 MCG tablet TAKE 1 TABLET BY MOUTH IN THE MORNING on an empty stomach. do not eat for 30 minutes  1   • lisinopril (PRINIVIL,ZESTRIL) 5 MG tablet TAKE 1 TABLET BY MOUTH IN THE MORNING FOR BLOOD PRESSURE  1   • meloxicam (MOBIC) 15 MG tablet Take 15 mg by mouth Every Morning.  3   • pravastatin (PRAVACHOL) 20 MG tablet Take 20 mg by mouth every night at bedtime.  1   • vitamin D (ERGOCALCIFEROL) 58002 units capsule capsule Take 50,000 Units by mouth 1 (One) Time Per Week.  0   • zolpidem (AMBIEN) 10 MG tablet Take 5 mg by mouth every night at bedtime. Patient states she takes 1/3 of 10 mg tab  2     No current facility-administered medications on file prior to visit.       Allergies   Allergen Reactions   • Penicillins Rash        Review of Systems   Constitutional: Negative.    HENT: Negative.    Eyes: Negative.    Respiratory: Negative.    Cardiovascular: Negative.    Gastrointestinal: Negative.    Endocrine: Negative.    Genitourinary: Negative.    Musculoskeletal: Positive for arthralgias and joint swelling.   Skin: Negative.    Allergic/Immunologic: Negative.    Neurological: Negative.    Hematological: Negative.    Psychiatric/Behavioral: Negative.         Physical Exam  not currently breastfeeding.    There is no height or weight on file to calculate BMI.    GENERAL APPEARANCE: awake, alert, oriented, in no acute distress and well developed, well nourished  LUNGS:  breathing nonlabored  EXTREMITIES: no clubbing, cyanosis       Left Lower Extremity Exam:    Medial incision is well-healed.  Lateral incision appears to be healing well.  There is a focal area measuring approximately 1 inch in length about the midportion of the incision that has some localized erythema.  There is no swelling.  A focal pinpoint area of purulence was expressed upon manual pressure with no marlo purulence or evidence of wound healing complication.  Ankle has range of  motion of 5 degrees dorsiflexion and 30 degrees plantar flexion.  No evidence of ankle swelling.    Palpable dorsalis pedis and posterior tibial pulses.  SENSATION TO LIGHT TOUCH:  DEEP PERONEAL/SUPERFICIAL PERONEAL/SURAL/SAPHENOUS/TIBIAL:   intact    EDEMA:  no    _______________________________________________________________  _______________________________________________________________    RADIOGRAPHIC FINDINGS:   Indication: Status post operative fixation left ankle fracture    Comparison: Todays xrays were compared to previous xrays from 4/5/2019    Left ankle 3 views: Radiographs demonstrate hardware in place without evidence of hardware complication.  Ankle mortise is symmetric.  Interval consolidation of the fracture is identified.    Assessment/Plan:   Diagnosis Plan   1. S/P ORIF (open reduction internal fixation) fracture  XR Ankle 3+ View Left    cephalexin (KEFLEX) 500 MG capsule     Patient is doing well 6 weeks status post operative fixation left ankle fracture.  She is to continue nonweightbearing and work on ankle range of motion.  She will also remain in the boot until follow-up appointment.  I will prescribe her Keflex x 7 days for prophylaxis for the stitch abscess.  I will see her back in 3 weeks for repeat assessment and scheduling for hardware removal of the syndesmotic screw at that time.    Ole Dominguez MD  05/03/19  12:14 PM

## 2019-05-24 ENCOUNTER — OFFICE VISIT (OUTPATIENT)
Dept: ORTHOPEDIC SURGERY | Facility: CLINIC | Age: 64
End: 2019-05-24

## 2019-05-24 VITALS — WEIGHT: 136.91 LBS | HEART RATE: 78 BPM | BODY MASS INDEX: 23.37 KG/M2 | OXYGEN SATURATION: 98 % | HEIGHT: 64 IN

## 2019-05-24 DIAGNOSIS — G89.29 CHRONIC LEFT SHOULDER PAIN: ICD-10-CM

## 2019-05-24 DIAGNOSIS — Z98.890 STATUS POST OPEN REDUCTION WITH INTERNAL FIXATION (ORIF) OF FRACTURE OF ANKLE: ICD-10-CM

## 2019-05-24 DIAGNOSIS — M25.512 CHRONIC LEFT SHOULDER PAIN: ICD-10-CM

## 2019-05-24 DIAGNOSIS — T84.84XA PAINFUL ORTHOPAEDIC HARDWARE (HCC): Primary | ICD-10-CM

## 2019-05-24 DIAGNOSIS — Z87.81 STATUS POST OPEN REDUCTION WITH INTERNAL FIXATION (ORIF) OF FRACTURE OF ANKLE: ICD-10-CM

## 2019-05-24 PROCEDURE — 99213 OFFICE O/P EST LOW 20 MIN: CPT | Performed by: ORTHOPAEDIC SURGERY

## 2019-05-24 RX ORDER — ACETAMINOPHEN 325 MG/1
1000 TABLET ORAL ONCE
Status: CANCELLED | OUTPATIENT
Start: 2019-05-24 | End: 2019-05-24

## 2019-06-10 ENCOUNTER — APPOINTMENT (OUTPATIENT)
Dept: PREADMISSION TESTING | Facility: HOSPITAL | Age: 64
End: 2019-06-10

## 2019-06-10 VITALS — WEIGHT: 140 LBS | HEIGHT: 64 IN | BODY MASS INDEX: 23.9 KG/M2

## 2019-06-10 DIAGNOSIS — T84.84XA PAINFUL ORTHOPAEDIC HARDWARE (HCC): ICD-10-CM

## 2019-06-10 LAB
ALBUMIN SERPL-MCNC: 4.4 G/DL (ref 3.5–5.2)
ALBUMIN/GLOB SERPL: 2 G/DL
ALP SERPL-CCNC: 80 U/L (ref 39–117)
ALT SERPL W P-5'-P-CCNC: 22 U/L (ref 1–33)
ANION GAP SERPL CALCULATED.3IONS-SCNC: 10 MMOL/L
AST SERPL-CCNC: 22 U/L (ref 1–32)
BASOPHILS # BLD AUTO: 0.06 10*3/MM3 (ref 0–0.2)
BASOPHILS NFR BLD AUTO: 1.1 % (ref 0–1.5)
BILIRUB SERPL-MCNC: 0.3 MG/DL (ref 0.2–1.2)
BUN BLD-MCNC: 21 MG/DL (ref 8–23)
BUN/CREAT SERPL: 25.9 (ref 7–25)
CALCIUM SPEC-SCNC: 10.8 MG/DL (ref 8.6–10.5)
CHLORIDE SERPL-SCNC: 101 MMOL/L (ref 98–107)
CO2 SERPL-SCNC: 29 MMOL/L (ref 22–29)
CREAT BLD-MCNC: 0.81 MG/DL (ref 0.57–1)
DEPRECATED RDW RBC AUTO: 45.3 FL (ref 37–54)
EOSINOPHIL # BLD AUTO: 0.12 10*3/MM3 (ref 0–0.4)
EOSINOPHIL NFR BLD AUTO: 2.1 % (ref 0.3–6.2)
ERYTHROCYTE [DISTWIDTH] IN BLOOD BY AUTOMATED COUNT: 13.6 % (ref 12.3–15.4)
GFR SERPL CREATININE-BSD FRML MDRD: 71 ML/MIN/1.73
GLOBULIN UR ELPH-MCNC: 2.2 GM/DL
GLUCOSE BLD-MCNC: 91 MG/DL (ref 65–99)
HCT VFR BLD AUTO: 39 % (ref 34–46.6)
HGB BLD-MCNC: 12.5 G/DL (ref 12–15.9)
IMM GRANULOCYTES # BLD AUTO: 0.02 10*3/MM3 (ref 0–0.05)
IMM GRANULOCYTES NFR BLD AUTO: 0.4 % (ref 0–0.5)
INR PPP: 0.98 (ref 0.85–1.16)
LYMPHOCYTES # BLD AUTO: 1.16 10*3/MM3 (ref 0.7–3.1)
LYMPHOCYTES NFR BLD AUTO: 20.3 % (ref 19.6–45.3)
MCH RBC QN AUTO: 29 PG (ref 26.6–33)
MCHC RBC AUTO-ENTMCNC: 32.1 G/DL (ref 31.5–35.7)
MCV RBC AUTO: 90.5 FL (ref 79–97)
MONOCYTES # BLD AUTO: 0.7 10*3/MM3 (ref 0.1–0.9)
MONOCYTES NFR BLD AUTO: 12.3 % (ref 5–12)
NEUTROPHILS # BLD AUTO: 3.65 10*3/MM3 (ref 1.7–7)
NEUTROPHILS NFR BLD AUTO: 63.8 % (ref 42.7–76)
NRBC BLD AUTO-RTO: 0 /100 WBC (ref 0–0.2)
PLATELET # BLD AUTO: 266 10*3/MM3 (ref 140–450)
PMV BLD AUTO: 9.6 FL (ref 6–12)
POTASSIUM BLD-SCNC: 4.9 MMOL/L (ref 3.5–5.2)
PROT SERPL-MCNC: 6.6 G/DL (ref 6–8.5)
PROTHROMBIN TIME: 12.5 SECONDS (ref 11.2–14.3)
RBC # BLD AUTO: 4.31 10*6/MM3 (ref 3.77–5.28)
SODIUM BLD-SCNC: 140 MMOL/L (ref 136–145)
WBC NRBC COR # BLD: 5.71 10*3/MM3 (ref 3.4–10.8)

## 2019-06-10 PROCEDURE — 85610 PROTHROMBIN TIME: CPT | Performed by: ORTHOPAEDIC SURGERY

## 2019-06-10 PROCEDURE — 85025 COMPLETE CBC W/AUTO DIFF WBC: CPT | Performed by: ORTHOPAEDIC SURGERY

## 2019-06-10 PROCEDURE — 36415 COLL VENOUS BLD VENIPUNCTURE: CPT

## 2019-06-10 PROCEDURE — 80053 COMPREHEN METABOLIC PANEL: CPT | Performed by: ORTHOPAEDIC SURGERY

## 2019-06-10 RX ORDER — MELOXICAM 15 MG/1
15 TABLET ORAL DAILY
COMMUNITY

## 2019-06-22 ENCOUNTER — ANESTHESIA EVENT (OUTPATIENT)
Dept: PERIOP | Facility: HOSPITAL | Age: 64
End: 2019-06-22

## 2019-06-22 RX ORDER — FAMOTIDINE 10 MG/ML
20 INJECTION, SOLUTION INTRAVENOUS ONCE
Status: CANCELLED | OUTPATIENT
Start: 2019-06-22 | End: 2019-06-22

## 2019-06-24 ENCOUNTER — APPOINTMENT (OUTPATIENT)
Dept: GENERAL RADIOLOGY | Facility: HOSPITAL | Age: 64
End: 2019-06-24

## 2019-06-24 ENCOUNTER — ANESTHESIA (OUTPATIENT)
Dept: PERIOP | Facility: HOSPITAL | Age: 64
End: 2019-06-24

## 2019-06-24 ENCOUNTER — HOSPITAL ENCOUNTER (OUTPATIENT)
Facility: HOSPITAL | Age: 64
Setting detail: HOSPITAL OUTPATIENT SURGERY
Discharge: HOME | End: 2019-06-24
Attending: ORTHOPAEDIC SURGERY | Admitting: ORTHOPAEDIC SURGERY

## 2019-06-24 VITALS
HEART RATE: 85 BPM | TEMPERATURE: 97.3 F | SYSTOLIC BLOOD PRESSURE: 135 MMHG | RESPIRATION RATE: 14 BRPM | OXYGEN SATURATION: 98 % | DIASTOLIC BLOOD PRESSURE: 74 MMHG

## 2019-06-24 DIAGNOSIS — T84.84XA PAINFUL ORTHOPAEDIC HARDWARE (HCC): ICD-10-CM

## 2019-06-24 LAB
GLUCOSE BLDC GLUCOMTR-MCNC: 85 MG/DL (ref 70–130)
POTASSIUM BLD-SCNC: 3.9 MMOL/L (ref 3.5–5.2)

## 2019-06-24 PROCEDURE — 84132 ASSAY OF SERUM POTASSIUM: CPT | Performed by: ANESTHESIOLOGY

## 2019-06-24 PROCEDURE — 20680 REMOVAL OF IMPLANT DEEP: CPT | Performed by: ORTHOPAEDIC SURGERY

## 2019-06-24 PROCEDURE — 76000 FLUOROSCOPY <1 HR PHYS/QHP: CPT

## 2019-06-24 PROCEDURE — 25010000002 DEXAMETHASONE PER 1 MG: Performed by: NURSE ANESTHETIST, CERTIFIED REGISTERED

## 2019-06-24 PROCEDURE — 82962 GLUCOSE BLOOD TEST: CPT

## 2019-06-24 PROCEDURE — 25010000002 FENTANYL CITRATE (PF) 100 MCG/2ML SOLUTION: Performed by: NURSE ANESTHETIST, CERTIFIED REGISTERED

## 2019-06-24 PROCEDURE — 25010000003 CEFAZOLIN IN DEXTROSE 2-4 GM/100ML-% SOLUTION: Performed by: ORTHOPAEDIC SURGERY

## 2019-06-24 PROCEDURE — 25010000002 ONDANSETRON PER 1 MG: Performed by: NURSE ANESTHETIST, CERTIFIED REGISTERED

## 2019-06-24 PROCEDURE — 25010000002 DEXAMETHASONE SODIUM PHOSPHATE 10 MG/ML SOLUTION: Performed by: NURSE ANESTHETIST, CERTIFIED REGISTERED

## 2019-06-24 PROCEDURE — 25010000002 BUPRENORPHINE PER 0.1 MG: Performed by: NURSE ANESTHETIST, CERTIFIED REGISTERED

## 2019-06-24 PROCEDURE — 25010000002 PROPOFOL 10 MG/ML EMULSION: Performed by: NURSE ANESTHETIST, CERTIFIED REGISTERED

## 2019-06-24 RX ORDER — FAMOTIDINE 20 MG/1
20 TABLET, FILM COATED ORAL ONCE
Status: COMPLETED | OUTPATIENT
Start: 2019-06-24 | End: 2019-06-24

## 2019-06-24 RX ORDER — HYDROCODONE BITARTRATE AND ACETAMINOPHEN 5; 325 MG/1; MG/1
1-2 TABLET ORAL EVERY 4 HOURS PRN
Qty: 20 TABLET | Refills: 0 | Status: SHIPPED | OUTPATIENT
Start: 2019-06-24 | End: 2019-07-09

## 2019-06-24 RX ORDER — SODIUM CHLORIDE 0.9 % (FLUSH) 0.9 %
3-10 SYRINGE (ML) INJECTION AS NEEDED
Status: DISCONTINUED | OUTPATIENT
Start: 2019-06-24 | End: 2019-06-24 | Stop reason: HOSPADM

## 2019-06-24 RX ORDER — BUPRENORPHINE HYDROCHLORIDE 0.32 MG/ML
INJECTION INTRAMUSCULAR; INTRAVENOUS
Status: COMPLETED | OUTPATIENT
Start: 2019-06-24 | End: 2019-06-24

## 2019-06-24 RX ORDER — DEXAMETHASONE SODIUM PHOSPHATE 10 MG/ML
INJECTION, SOLUTION INTRAMUSCULAR; INTRAVENOUS
Status: COMPLETED | OUTPATIENT
Start: 2019-06-24 | End: 2019-06-24

## 2019-06-24 RX ORDER — SODIUM CHLORIDE 0.9 % (FLUSH) 0.9 %
3 SYRINGE (ML) INJECTION EVERY 12 HOURS SCHEDULED
Status: DISCONTINUED | OUTPATIENT
Start: 2019-06-24 | End: 2019-06-24 | Stop reason: HOSPADM

## 2019-06-24 RX ORDER — SODIUM CHLORIDE, SODIUM LACTATE, POTASSIUM CHLORIDE, CALCIUM CHLORIDE 600; 310; 30; 20 MG/100ML; MG/100ML; MG/100ML; MG/100ML
9 INJECTION, SOLUTION INTRAVENOUS CONTINUOUS
Status: DISCONTINUED | OUTPATIENT
Start: 2019-06-24 | End: 2019-06-24 | Stop reason: HOSPADM

## 2019-06-24 RX ORDER — LIDOCAINE HYDROCHLORIDE 10 MG/ML
0.5 INJECTION, SOLUTION EPIDURAL; INFILTRATION; INTRACAUDAL; PERINEURAL ONCE AS NEEDED
Status: COMPLETED | OUTPATIENT
Start: 2019-06-24 | End: 2019-06-24

## 2019-06-24 RX ORDER — DEXAMETHASONE SODIUM PHOSPHATE 4 MG/ML
8 INJECTION, SOLUTION INTRA-ARTICULAR; INTRALESIONAL; INTRAMUSCULAR; INTRAVENOUS; SOFT TISSUE ONCE AS NEEDED
Status: DISCONTINUED | OUTPATIENT
Start: 2019-06-24 | End: 2019-06-24 | Stop reason: HOSPADM

## 2019-06-24 RX ORDER — ONDANSETRON 2 MG/ML
INJECTION INTRAMUSCULAR; INTRAVENOUS AS NEEDED
Status: DISCONTINUED | OUTPATIENT
Start: 2019-06-24 | End: 2019-06-24 | Stop reason: SURG

## 2019-06-24 RX ORDER — ONDANSETRON 2 MG/ML
4 INJECTION INTRAMUSCULAR; INTRAVENOUS ONCE AS NEEDED
Status: DISCONTINUED | OUTPATIENT
Start: 2019-06-24 | End: 2019-06-24 | Stop reason: HOSPADM

## 2019-06-24 RX ORDER — LIDOCAINE HYDROCHLORIDE 10 MG/ML
INJECTION, SOLUTION EPIDURAL; INFILTRATION; INTRACAUDAL; PERINEURAL AS NEEDED
Status: DISCONTINUED | OUTPATIENT
Start: 2019-06-24 | End: 2019-06-24 | Stop reason: SURG

## 2019-06-24 RX ORDER — FENTANYL CITRATE 50 UG/ML
INJECTION, SOLUTION INTRAMUSCULAR; INTRAVENOUS AS NEEDED
Status: DISCONTINUED | OUTPATIENT
Start: 2019-06-24 | End: 2019-06-24 | Stop reason: SURG

## 2019-06-24 RX ORDER — MAGNESIUM HYDROXIDE 1200 MG/15ML
LIQUID ORAL AS NEEDED
Status: DISCONTINUED | OUTPATIENT
Start: 2019-06-24 | End: 2019-06-24 | Stop reason: HOSPADM

## 2019-06-24 RX ORDER — PROPOFOL 10 MG/ML
VIAL (ML) INTRAVENOUS AS NEEDED
Status: DISCONTINUED | OUTPATIENT
Start: 2019-06-24 | End: 2019-06-24 | Stop reason: SURG

## 2019-06-24 RX ORDER — DEXAMETHASONE SODIUM PHOSPHATE 4 MG/ML
INJECTION, SOLUTION INTRA-ARTICULAR; INTRALESIONAL; INTRAMUSCULAR; INTRAVENOUS; SOFT TISSUE AS NEEDED
Status: DISCONTINUED | OUTPATIENT
Start: 2019-06-24 | End: 2019-06-24 | Stop reason: SURG

## 2019-06-24 RX ORDER — ACETAMINOPHEN 500 MG
1000 TABLET ORAL ONCE
Status: COMPLETED | OUTPATIENT
Start: 2019-06-24 | End: 2019-06-24

## 2019-06-24 RX ORDER — BUPIVACAINE HYDROCHLORIDE 2.5 MG/ML
INJECTION, SOLUTION EPIDURAL; INFILTRATION; INTRACAUDAL
Status: COMPLETED | OUTPATIENT
Start: 2019-06-24 | End: 2019-06-24

## 2019-06-24 RX ORDER — HYDROMORPHONE HYDROCHLORIDE 1 MG/ML
0.5 INJECTION, SOLUTION INTRAMUSCULAR; INTRAVENOUS; SUBCUTANEOUS
Status: DISCONTINUED | OUTPATIENT
Start: 2019-06-24 | End: 2019-06-24 | Stop reason: HOSPADM

## 2019-06-24 RX ORDER — CEFAZOLIN SODIUM 2 G/100ML
2 INJECTION, SOLUTION INTRAVENOUS ONCE
Status: COMPLETED | OUTPATIENT
Start: 2019-06-24 | End: 2019-06-24

## 2019-06-24 RX ORDER — FENTANYL CITRATE 50 UG/ML
50 INJECTION, SOLUTION INTRAMUSCULAR; INTRAVENOUS
Status: DISCONTINUED | OUTPATIENT
Start: 2019-06-24 | End: 2019-06-24 | Stop reason: HOSPADM

## 2019-06-24 RX ADMIN — BUPIVACAINE HYDROCHLORIDE 30 ML: 2.5 INJECTION, SOLUTION EPIDURAL; INFILTRATION; INTRACAUDAL; PERINEURAL at 08:29

## 2019-06-24 RX ADMIN — DEXAMETHASONE SODIUM PHOSPHATE 4 MG: 10 INJECTION INTRAMUSCULAR; INTRAVENOUS at 08:29

## 2019-06-24 RX ADMIN — FAMOTIDINE 20 MG: 20 TABLET ORAL at 08:01

## 2019-06-24 RX ADMIN — ACETAMINOPHEN 1000 MG: 500 TABLET ORAL at 08:01

## 2019-06-24 RX ADMIN — BUPRENORPHINE HYDROCHLORIDE 0.3 MG: 0.32 INJECTION INTRAMUSCULAR; INTRAVENOUS at 08:29

## 2019-06-24 RX ADMIN — PROPOFOL 150 MG: 10 INJECTION, EMULSION INTRAVENOUS at 11:02

## 2019-06-24 RX ADMIN — CEFAZOLIN SODIUM 2 G: 2 INJECTION, SOLUTION INTRAVENOUS at 10:58

## 2019-06-24 RX ADMIN — LIDOCAINE HYDROCHLORIDE 50 MG: 10 INJECTION, SOLUTION EPIDURAL; INFILTRATION; INTRACAUDAL; PERINEURAL at 11:02

## 2019-06-24 RX ADMIN — EPHEDRINE SULFATE 10 MG: 50 INJECTION INTRAMUSCULAR; INTRAVENOUS; SUBCUTANEOUS at 11:25

## 2019-06-24 RX ADMIN — LIDOCAINE HYDROCHLORIDE 0.5 ML: 10 INJECTION, SOLUTION EPIDURAL; INFILTRATION; INTRACAUDAL; PERINEURAL at 08:01

## 2019-06-24 RX ADMIN — ONDANSETRON 4 MG: 2 INJECTION INTRAMUSCULAR; INTRAVENOUS at 11:25

## 2019-06-24 RX ADMIN — DEXAMETHASONE SODIUM PHOSPHATE 8 MG: 4 INJECTION, SOLUTION INTRAMUSCULAR; INTRAVENOUS at 11:09

## 2019-06-24 RX ADMIN — FENTANYL CITRATE 50 MCG: 50 INJECTION, SOLUTION INTRAMUSCULAR; INTRAVENOUS at 11:02

## 2019-06-24 RX ADMIN — SODIUM CHLORIDE, POTASSIUM CHLORIDE, SODIUM LACTATE AND CALCIUM CHLORIDE 9 ML/HR: 600; 310; 30; 20 INJECTION, SOLUTION INTRAVENOUS at 08:01

## 2019-06-24 NOTE — ANESTHESIA PREPROCEDURE EVALUATION
Anesthesia Evaluation     Patient summary reviewed and Nursing notes reviewed                Airway   Mallampati: II  Dental      Pulmonary - negative pulmonary ROS   Cardiovascular     (+) hypertension,       Neuro/Psych- negative ROS  GI/Hepatic/Renal/Endo    (+)   hypothyroidism,     Musculoskeletal (-) negative ROS    Abdominal    Substance History - negative use     OB/GYN negative ob/gyn ROS         Other                        Anesthesia Plan    ASA 3     general     intravenous induction   Anesthetic plan, all risks, benefits, and alternatives have been provided, discussed and informed consent has been obtained with: patient.

## 2019-06-24 NOTE — ANESTHESIA PROCEDURE NOTES
Airway  Urgency: elective    Date/Time: 6/24/2019 11:02 AM  End Time:6/24/2019 11:03 AM  Airway not difficult    General Information and Staff    Patient location during procedure: OR  CRNA: Case Desir CRNA    Indications and Patient Condition  Indications for airway management: airway protection    Preoxygenated: yes  Mask difficulty assessment: 1 - vent by mask    Final Airway Details  Final airway type: supraglottic airway      Successful airway: I-gel  Size 4    Number of attempts at approach: 1    Additional Comments  LMA placed without difficulty, ventilation with assist, equal breath sounds and symmetric chest rise and fall

## 2019-06-24 NOTE — BRIEF OP NOTE
ANKLE/FOOT HARDWARE REMOVAL  Progress Note    Merry Tucker  6/24/2019    Pre-op Diagnosis:   Painful orthopaedic hardware (CMS/HCC) [T84.84XA]       Post-Op Diagnosis Codes:     * Painful orthopaedic hardware (CMS/HCC) [T84.84XA]    Procedure/CPT® Codes:  AR REMOVAL DEEP IMPLANT [56190]  CHG MANUAL APPL STRESS PFRMD PHYS/QHP JOINT FILMS [69513]    Procedure(s):  ANKLE/FOOT HARDWARE REMOVAL    Surgeon(s):  Ole Dominguez MD    Anesthesia: Choice    Staff:   Circulator: Sandra Sanders RN; Jenny Ross RN  Scrub Person: Tay Estrada    Estimated Blood Loss: minimal    Urine Voided: * No values recorded between 6/24/2019 10:57 AM and 6/24/2019 11:33 AM *    Specimens:                None          Drains:      Findings: Stable left ankle syndesmosis after screw removal    Complications: None apparent      Ole Dominguez MD     Date: 6/24/2019  Time: 11:34 AM

## 2019-06-24 NOTE — ANESTHESIA PROCEDURE NOTES
Peripheral Block      Patient location during procedure: pre-op  Reason for block: at surgeon's request and post-op pain management  Performed by  CRNA: Shira Desai CRNA  Assisted by: Marilyn Narvaez  Preanesthetic Checklist  Completed: patient identified, site marked, surgical consent, pre-op evaluation, timeout performed, IV checked, risks and benefits discussed and monitors and equipment checked  Prep:  Sterile barriers:cap, gloves, mask and sterile barriers  Prep: ChloraPrep  Patient monitoring: blood pressure monitoring, continuous pulse oximetry and EKG  Procedure  Sedation:yes    Guidance:ultrasound guided  Images:still images obtained    Block Type:popliteal  Injection Technique:single-shot  Needle Type:echogenic  Needle Gauge:18 G    Catheter Size:20 G    Medications Used: buprenorphine (BUPRENEX) injection, 0.3 mg  dexamethasone sodium phosphate injection, 4 mg  bupivacaine PF (MARCAINE) 0.25 % injection, 30 mL  Med admintered at 6/24/2019 8:29 AM      Post Assessment  Injection Assessment: negative aspiration for heme, no paresthesia on injection and incremental injection  Patient Tolerance:comfortable throughout block  Complications:no  Additional Notes  Procedure:                                                         The pt was placed in  lateral position.  The Insertion site was  prepped and Draped in sterile fashion.  The pt was anesthetized with  IV Sedation( see meds).  Skin and cutaneous tissue was infiltrated and anesthetized with 1% Lidocaine 3 mls via a 25g needle.  A BBraun 4 inch 18g echogenic needle was then  inserted approximately 3 cm proximal to the popliteal dustin a at the lateral mid biceps femoris and advanced In-plane with Ultrasound guidance.  Normal Saline PSF was utilized for hydrodissection of tissue.  The popliteal artery was visualized and the common peroneal and tibial bifurcation was located.  LA injection spread was visualized, injection was incremental 1-5ml, injection  pressure was normal or little, no intraneural injection, no vascular injection.  .  A BBraun 20g wire stylet catheter was placed via the needle with ultrasound visualization and confirmation with NS fluid bolus. The labeled Catheter was then secured at insertions site with skin afix,  mastisol, steristrips  and a CHG transparent dressing was placed over. Thank you

## 2019-06-24 NOTE — H&P
Pre-Op H&P  Merry Tucker  [id number]  1955    Chief complaint: Left ankle hardware pain    HPI:    5/24/19 office visit:  Ms. Tucker is 9  week(s) s/p open reduction internal fixation left ankle fracture with syndesmotic stabilization.  She rates her pain 0/10 on the pain scale.  She has been nonweightbearing as instructed.  She denies fevers chills or constitutional symptoms.  Patient is also complaining of left chronic shoulder pain that has been ongoing for about 4 months.  She states that she was lifting a pack of birdseed and felt a pop in her shoulder as well as some pain.  The pain gradually improved but she has been having some intermittent pain throughout the shoulder with certain activities.  She is concerned she may have damaged the shoulder.  Overall she is doing well.    6/24/19:  Here today to undergo left ankle/foot hardware removal    Review of Systems:  General ROS: negative for chills, fever or skin lesions;  No changes since last office visit  Cardiovascular ROS: no chest pain or dyspnea on exertion  Respiratory ROS: no cough, shortness of breath, or wheezing    Allergies:   Allergies   Allergen Reactions   • Penicillins Rash     Has pedro luis. Keflex in the past       Home Meds:    No current facility-administered medications on file prior to encounter.      Current Outpatient Medications on File Prior to Encounter   Medication Sig Dispense Refill   • CALCIUM CITRATE-VITAMIN D PO Take 1,000 mg by mouth.     • Cholecalciferol (VITAMIN D PO) Take  by mouth.     • levothyroxine (SYNTHROID, LEVOTHROID) 50 MCG tablet TAKE 1 TABLET BY MOUTH IN THE MORNING on an empty stomach. do not eat for 30 minutes  1   • lisinopril (PRINIVIL,ZESTRIL) 5 MG tablet TAKE 1 TABLET BY MOUTH IN THE MORNING FOR BLOOD PRESSURE  1   • pravastatin (PRAVACHOL) 20 MG tablet Take 20 mg by mouth every night at bedtime.  1   • zolpidem (AMBIEN) 10 MG tablet Take 5 mg by mouth every night at bedtime. Patient states she takes 1/3  of 10 mg tab  2   • vitamin D (ERGOCALCIFEROL) 18692 units capsule capsule Take 50,000 Units by mouth 1 (One) Time Per Week.  0       PMH:   Past Medical History:   Diagnosis Date   • Anxiety    • Arthritis    • Cancer (CMS/HCC)     squamous skin cancer removed    • Cataracts, bilateral    • Diverticulitis    • High cholesterol    • Hypertension    • Hypothyroid    • IBS (irritable bowel syndrome)    • PONV (postoperative nausea and vomiting)    • Stress incontinence    • Wears glasses      PSH:    Past Surgical History:   Procedure Laterality Date   • ANKLE OPEN REDUCTION INTERNAL FIXATION Right 2003    orif   • ANKLE OPEN REDUCTION INTERNAL FIXATION Left 3/20/2019    Procedure: ANKLE OPEN REDUCTION INTERNAL FIXATION LEFT;  Surgeon: Ole Dominguez MD;  Location: [facility];  Service: Orthopedics   • AUGMENTATION MAMMAPLASTY Bilateral 1987    silicone    • COLONOSCOPY      2017   • COSMETIC SURGERY      tummy tuck and facelift     Social History:   Tobacco:   Social History     Tobacco Use   Smoking Status Never Smoker   Smokeless Tobacco Never Used      Alcohol:     Social History     Substance and Sexual Activity   Alcohol Use Yes    Comment: social       Vitals:           /82 (BP Location: Right arm, Patient Position: Lying)   Pulse 73   Temp 97.6 °F (36.4 °C) (Temporal)   Resp 18   SpO2 98%     Physical Exam:  General Appearance:    Alert, cooperative, no distress, appears stated age   Head:    Normocephalic, without obvious abnormality, atraumatic   Lungs:     Clear to auscultation bilaterally, respirations unlabored    Heart:   Regular rate and rhythm, S1 and S2 normal, no murmur, rub    or gallop    Abdomen:    Soft, non-tender.  +bowel sounds   Breast Exam:    deferred   Genitalia:    deferred   Extremities:   Extremities normal, atraumatic, no cyanosis or edema   Skin:   Skin color, texture, turgor normal, no rashes or lesions   Neurologic:   Grossly intact   Results Review  LABS:  Lab Results    Component Value Date    WBC 5.71 06/10/2019    HGB 12.5 06/10/2019    HCT 39.0 06/10/2019    MCV 90.5 06/10/2019     06/10/2019    NEUTROABS 3.65 06/10/2019    GLUCOSE 91 06/10/2019    BUN 21 06/10/2019    CREATININE 0.81 06/10/2019    EGFRIFNONA 71 06/10/2019     06/10/2019    K 3.9 06/24/2019     06/10/2019    CO2 29.0 06/10/2019    CALCIUM 10.8 (H) 06/10/2019    ALBUMIN 4.40 06/10/2019    AST 22 06/10/2019    ALT 22 06/10/2019    BILITOT 0.3 06/10/2019       RADIOLOGY:  Imaging Results (last 72 hours)     ** No results found for the last 72 hours. **          I reviewed the patient's new clinical results.    Cancer Staging (if applicable)  Cancer Patient: __ yes _x_no __unknown; If yes, clinical stage T:__ N:__M:__, stage group or __N/A    Impression/Plan:    In regards to the ankle, it is well-healed.  I recommended hardware removal prior to initiation of weightbearing.  We will fill out paperwork and schedule her for hardware removal at 3 months period.  She is agreeable to this plan.     The patient has clinical and radiographic evidence of symptomatic left ankle hardware which is severely restricting the patient's activities as well as quality of life. The recommendation at this time is to proceed with a left ankle syndesmotic screw removal with the goal to improve patient function, decrease pain, and improve mobility. The risks, benefits, potential complications, and alternatives were discussed with the patient in detail. Risks included but were not limited to bleeding, infection, anesthesia risks, damage to neurovascular structures, recurrent injury to syndesmosis, pain, continued pain, iatrogenic fracture, possible need for future surgery including the potential for amputation, blood clots, myocardial infarction, stroke, and death. Tessie-operative blood management and the potential for blood transfusion were discussed with risks and options clearly outlined. Specific details of the  surgical procedure, hospitalization, recovery, rehabilitation, and long-term precautions were also presented. Pre-operative teaching was provided. Implant/prosthesis and equipment selection was outlined, and the many options available were explained; the final choice will be made at the time of the procedure to match the anatomy and condition of the bone, ligaments, tendons, and muscles. Given this instruction, the patient elected to proceed with the left ankle hardware removal.     EMORY Nation   6/24/2019   8:56 AM

## 2019-06-24 NOTE — OP NOTE
OPERATIVE REPORT     DATE OF PROCEDURE: 6/24/2019    SURGEON: Ole Dominguez M.D.     ASSISTANT(S): Circulator: Sandra Sanders RN; Jenny Ross RN  Scrub Person: Tay Estrada     PREOPERATIVE DIAGNOSIS: Left ankle symptomatic ankle hardware    POSTOPERATIVE DIAGNOSIS: same     PROCEDURE: Left ankle hardware removal, stress radiography of distal tib-fib articulation    SURGICAL DETAILS:     APPROACH: Direct lateral    ANESTHESIA: General LMA plus regional    PREOPERATIVE ANTIBIOTICS: Ancef 2 g IV    ESTIMATED BLOOD LOSS: 5 cc     TOURNIQUET TIME: 0 min     SPECIMENS: Left ankle screw x1    IMPLANTS: None    DRAINS: None    LOCAL INJECTION: None    MODIFIER(S): None    COMPLICATIONS: None apparent    INDICATIONS FOR PROCEDURE: Patient is a 64-year-old female who underwent surgical stabilization of an unstable ankle fracture including syndesmotic stabilization 3 months ago.  Patient's fractures went on to heal uneventfully after open reduction internal fixation.  In order to stabilize the syndesmosis, a transfixion screw was placed to call us the distal tib-fib articulation.  Discussion was had regarding screw removal to allow for adequate restoration of the native mobility of the distal tib-fib articulation.  The patient wished to proceed with this.  The risks, benefits, alternatives, and potential complications of the this surgery were discussed with the patient in detail to include but not limited to infection, bleeding, anesthesia risks, nerve injury, vessel injury, pain, blood clots, possible need for blood transfusion, possible need for further procedures, myocardial infarction, stroke, and death.  Specific risk for this surgery include recurrent syndesmotic injury. Specific details of the procedure, hospitalization, recovery, rehabilitation, and long-term precautions were also provided. Pre-operative teaching was provided. Surgical device selection was outlined, and the many options available  were explained; final choices will be made at the time of the procedure to match the anatomy and condition of the bone, and soft tissue structures. Understanding of all topics was conveyed to me by the patient, and consent was given to proceed with a left ankle hardware removal.     INTRAOPERATIVE FINDINGS: Stable syndesmosis, well-healed ankle fracture with no evidence of residual instability    PROCEDURE: The patient was identified in the preoperative holding area. The operative site was confirmed and marked. A sequential compression device was placed on the nonoperative leg. The risks, benefits, and alternatives to surgery were again confirmed with the patient and the patient wished to proceed. The patient was brought to the operating room and placed on the operating room table in the supine position. A huddle was performed with the patient and all vital surgical team members to confirm the correct operative site, procedure, anesthesia type, and operative plan with the patient. After anesthesia was performed, the patient was positioned in the supine position. All bony prominences were padded.  Nonsterile tourniquet was placed on the operative thigh. Intravenous antibiotic prophylaxis was given and confirmed with the anesthesia team. The operative extremity was prepped and draped in the usual sterile fashion. A surgical time out was performed immediately preceding the incision with all personnel in the operating room to confirm patient identity, the correct operative site and extremity, correct radiographic studies, availability of appropriate surgical equipment and agreement on the planned procedure.     Next, under fluoroscopic guidance, the level of the syndesmotic screw was identified through the prior incision.  A small incision was made utilizing the prior lateral based incision using a 15 blade.  Full-thickness blunt dissection occurred using a hemostat down to the level of the hardware and lateral plate.   Using the appropriate screwdriver, the screw head was identified and the screw was removed from the ankle.    Next, fluoroscopic imaging was utilized to perform a stress test of the syndesmosis.  Both a direct lateral translation of the foot in a dorsiflexed position was performed as well as a dorsiflexion external rotation view.  Both stress test demonstrated no evidence of syndesmotic instability.  The ankle fracture appeared to be well-healed.    Satisfied with this, the wound was then irrigated and closed in layered fashion using 2-0 Vicryl suture for the subcutaneous layer and 3-0 nylon for skin.  Sterile dressing consisting of Xeroform, 4 x 4, soft roll, and Ace wrap were then applied.  The patient was then awakened from anesthesia and transferred to PACU in stable condition.    No apparent complications occurred during the procedure Instrument, sponge and needle counts were correct x 2.     POST OPERATIVE PLAN:   Weight Bearing: Protected weightbearing as tolerated  DVT prophylaxis: Daily ambulation  Therapy/Activity: PT for gait training  Wound Care: Dressing to remain in place for 7 days postop  Pain control: Norco 5  Follow up: Dr. Dominguez 2 weeks

## 2019-06-24 NOTE — ANESTHESIA POSTPROCEDURE EVALUATION
Patient: Merry Tucker    Procedure Summary     Date:  06/24/19 [facility] / [facility]:   [facility]    Anesthesia Start:  1058 Anesthesia Stop:  1139    Procedure:  ANKLE/FOOT HARDWARE REMOVAL (Left Ankle) Diagnosis:       Painful orthopaedic hardware (CMS/HCC)      (Painful orthopaedic hardware (CMS/HCC) [T84.84XA])    Surgeon:  Ole Dominguez MD Provider:  Ruy Cedillo MD    Anesthesia Type:  general ASA Status:  3          Anesthesia Type: general  Last vitals  BP   135/82 (06/24/19 0754)   Temp   97.6 °F (36.4 °C) (06/24/19 0754)   Pulse   73 (06/24/19 0754)   Resp   18 (06/24/19 0754)     SpO2   98 % (06/24/19 0754)     Post Anesthesia Care and Evaluation    Patient location during evaluation: PACU  Patient participation: complete - patient participated  Level of consciousness: obtunded/minimal responses  Pain score: 0  Pain management: adequate  Airway patency: patent  Anesthetic complications: No anesthetic complications  PONV Status: none  Cardiovascular status: hemodynamically stable and acceptable  Respiratory status: nonlabored ventilation, acceptable and nasal cannula  Hydration status: acceptable

## 2019-06-24 NOTE — DISCHARGE INSTRUCTIONS
"DISCHARGE INSTRUCTIONS   Dr. Dominguez     Left ankle hardware removal    Wound Care   1) Keep wound / incision area clean and dry.   2) Dressing to remain in place until post-operative day 7. If a splint or cast is present, leave in place until next appointment. Upon dressing removal (if no splint/cast present), assess for wound drainage. If no drainage is present, keep wound / incision area open to air as much as possible. If drainage is present, place sterile dressing to cover wound and assess daily. If drainage continues to occur after post-operative day 10, call the office for an urgent appointment. (You should be seen in the clinic within 1-2 days of calling).   3) No baths or swimming until otherwise instructed. The wound must remain dry for 10 days after surgery. After 10 days, you may begin to shower only if no drainage is present. No submerging the wound under standing water until cleared by your physician (no baths, hot tubs, swimming pools, etc). Sponge baths are the best way to perform personal hygiene while at the same time protecting the wound from moisture. If splint or cast is present, do not allow it to get wet.   4) Prior to showering, the wound must remain dry for 72 consecutive hours (no drainage whatsoever) prior to showering. If the wound drains or spots, the clock \"resets\" - make sure the wound has been drainage-free for 72 consecutive hours.   5) Once you are allowed to get the wound wet, please use gentle soap to wash the wound area. DO NOT aggressively scrub the wound with a washcloth or bath sponge. Please visually inspect your wound(s) at least once daily. If the wound(s) are in a difficult to see location, please use a mirror or have someone else assist with visual inspection.   6) No scrubbing the wound. You may \"pad dry\" the wound, but do not rub, as this may open up the wound and pre-dispose to wound infection.   7) Do not apply lotions or creams to incision site, unless instructed " "otherwise.   8) Observe for redness, swelling, or drainage. Please call the clinic immediately if you have fevers, chills with warmth/redness surrounding wound site or if you notice pus drainage from the wound site     Activity   No heavy lifting objects greater than 10 pounds.   No driving while on narcotic pain medication.   No submerging wound under standing water (pool, bath tub, etc.) until otherwise instructed.   You may be protected weightbearing as tolerated on your operative (left lower) extremity   Use crutches or a walker for ambulation as instructed.      Blood Clot Prophylaxis   (Aspirin vs. Lovenox administration is determined by your surgeon and tailored to your specific risk profile. You will be discharged with either of these medications, but not both.) You will need to complete a total 4 week course of enteric coated aspirin 325 mg twice daily, in order to minimize your risk of blood clots following surgery. You will be supplied with a prescription to obtain this. You will need to compete a total 2 week course of Lovenox after surgery, in order to minimize the risk of blood clots following surgery. This requires a single shot in the abdomen, to be taken once daily. You will be supplied with the prescription to obtain this. Prior to your discharge from the hospital, the nursing staff will instruct you on self-administration of the Lovenox, if you will be returning directly home from the hospital.     Discharge Pain Medications   You will be given a prescription for pain medication. You should start taking this the same day after your surgery. Wean off as tolerated. Do not wait to take the pain medication until the pain is severe, as it will be difficult to \"catch up\" once this occurs. The pain medication usually reaches its full effect ~1 hour after ingesting. If you have been sent home on Valium, this medication works well for muscle spasms. If you have been sent home on Colace, this medication " "should be taken until you are off all narcotic (i.e. Norco, Percocet, Oxycodone, etc) pain medications, in order to prevent constipation. Percocet or Norco have Tylenol in their ingredient lists. You must be careful not to exceed 4,000mg (4 grams) of Tylenol, from all sources, within a single 24-hr period. This means that you may not take more than 12 Percocets or Norcos within a 24-hr period. Do NOT take Regular or Extra Strength Tylenol when taking your Percocet or Norco medications. If you have been sent home with a combination of oxycodone and Tylenol, please take Tylenol as scheduled.  The oxycodone is to be taken as needed for \"breakthrough\" pain.  Some common side effects of the narcotic pain medications (Percocet, Oxycodone, Norco, etc) include nausea and itching. Benadryl is a great over the counter medication that helps calm your stomach, decreases your anxiety levels, and minimizes the itching. You can easily purchase this at your local pharmacy as an over-the-counter medication. Please abide by the instructions as printed on the bottle. If your nausea persists, make sure to take small amounts of crackers or other lighter foods.     Follow-Up   Follow-up with Dr. Dominguez's office in 2 weeks from the surgery date for a post-operative evaluation. Have the following xrays done upon arrival to the follow-up appointment: Left ankle 3 views. Please call Dr. Dominguez's office at [phone] for orthopaedic appointments or questions.  "

## 2019-06-27 ENCOUNTER — TELEPHONE (OUTPATIENT)
Dept: ORTHOPEDIC SURGERY | Facility: CLINIC | Age: 64
End: 2019-06-27

## 2019-06-27 NOTE — TELEPHONE ENCOUNTER
PATIENT WOULD LIKE A RETURN CALL TO DISCUSS HER LEVEL OF ACTIVITY AFTER HER SURGERY. SHE HAD A PIN REMOVED FROM HER ANKLE ON THIS PAST Monday. SHE CAN BE REACHED [phone].

## 2019-06-27 NOTE — TELEPHONE ENCOUNTER
Left ankle hardware removal, stress radiography of distal tib-fib articulation 06/24/2019.  I spoke with pt. She asked about aftercare, and if she can grocery shop. Per Dr. Dominguez Post Op note, I explained that she can WB as tolerated. At the end of the day, if she feels like she over did her activity, she should back down a little. Just protected weightbearing as tolerated. Her dressing to remain in place for 7 days post op. She is taking extra strength tylenol PRN instead of Norco that was prescribed. She questioned when she could start physical therapy. I explained that Dr. Dominguez with go over that on her 1st post op appt. Pt verbalized understanding.   -TMT 06/27/2019

## 2019-07-09 ENCOUNTER — OFFICE VISIT (OUTPATIENT)
Dept: ORTHOPEDIC SURGERY | Facility: CLINIC | Age: 64
End: 2019-07-09

## 2019-07-09 DIAGNOSIS — Z87.81 STATUS POST OPEN REDUCTION WITH INTERNAL FIXATION (ORIF) OF FRACTURE OF ANKLE: Primary | ICD-10-CM

## 2019-07-09 DIAGNOSIS — Z98.890 STATUS POST OPEN REDUCTION WITH INTERNAL FIXATION (ORIF) OF FRACTURE OF ANKLE: Primary | ICD-10-CM

## 2019-07-09 PROCEDURE — 99024 POSTOP FOLLOW-UP VISIT: CPT | Performed by: PHYSICIAN ASSISTANT

## 2019-07-09 RX ORDER — FLUOXETINE HYDROCHLORIDE 40 MG/1
CAPSULE ORAL
COMMUNITY
Start: 2019-06-11

## 2019-07-10 ENCOUNTER — HOSPITAL ENCOUNTER (OUTPATIENT)
Dept: PHYSICAL THERAPY | Facility: HOSPITAL | Age: 64
Setting detail: THERAPIES SERIES
Discharge: HOME | End: 2019-07-10

## 2019-07-10 DIAGNOSIS — Z87.81 S/P ORIF (OPEN REDUCTION INTERNAL FIXATION) FRACTURE: Primary | ICD-10-CM

## 2019-07-10 DIAGNOSIS — Z98.890 S/P ORIF (OPEN REDUCTION INTERNAL FIXATION) FRACTURE: Primary | ICD-10-CM

## 2019-07-10 PROCEDURE — 97161 PT EVAL LOW COMPLEX 20 MIN: CPT | Performed by: PHYSICAL THERAPIST

## 2019-07-10 NOTE — THERAPY EVALUATION
"    Outpatient Physical Therapy Ortho Initial Evaluation   [place]     Patient Name: Merry Tucker  : 1955  MRN: [id number]  Today's Date: 7/10/2019      Visit Date: 07/10/2019    Patient Active Problem List   Diagnosis   • Closed trimalleolar fracture of ankle with high fibular fracture   • Painful orthopaedic hardware (CMS/HCC)        Past Medical History:   Diagnosis Date   • Anxiety    • Arthritis    • Cancer (CMS/HCC)     squamous skin cancer removed    • Cataracts, bilateral    • Diverticulitis    • High cholesterol    • Hypertension    • Hypothyroid    • IBS (irritable bowel syndrome)    • PONV (postoperative nausea and vomiting)    • Stress incontinence    • Wears glasses         Past Surgical History:   Procedure Laterality Date   • ANKLE OPEN REDUCTION INTERNAL FIXATION Right     orif   • ANKLE OPEN REDUCTION INTERNAL FIXATION Left 3/20/2019    Procedure: ANKLE OPEN REDUCTION INTERNAL FIXATION LEFT;  Surgeon: Ole Dominguez MD;  Location:  [facility];  Service: Orthopedics   • AUGMENTATION MAMMAPLASTY Bilateral     silicone    • COLONOSCOPY         • COSMETIC SURGERY      tummy tuck and facelift   • HARDWARE REMOVAL Left 2019    Procedure: ANKLE/FOOT HARDWARE REMOVAL;  Surgeon: Ole Dominguez MD;  Location:  [facility];  Service: Orthopedics       Visit Dx:     ICD-10-CM ICD-9-CM   1. S/P ORIF (open reduction internal fixation) fracture Z96.7 V45.89    Z87.81 V15.51         Patient History     Row Name 07/10/19 1100             History    Chief Complaint  Pain  -CP      Type of Pain  Ankle pain  -CP      Date Current Problem(s) Began  19  -CP      Brief Description of Current Complaint  Pt had a mechanical fall in March, resulted in acute left ankle pain, diagnosed with unstable trimalleolar ankle fracture. She has been NWB for 3 months (3/20-), recently had hardware removed 19. She is \"protected weight bearing as tolerated.\" Pt states ankle pain, " weakness, and impaired balance has gotten worse. She also reports h/o right ankle ORIF in 2003.   -CP      Previous treatment for THIS PROBLEM  Surgery  -CP      Surgery Date:  03/20/19  -CP      Patient/Caregiver Goals  Relieve pain;Improve mobility  -CP      Current Tobacco Use  nonuser  -CP      Smoking Status  no  -CP      Patient's Rating of General Health  Good  -CP      Hand Dominance  right-handed  -CP      Occupation/sports/leisure activities  Pt is , is self employed. Pt lives in private home, has full flight of stairs.   -CP      Patient seeing anyone else for problem(s)?  yes, Dr. Dominguez, Mee GARRETT.   -CP      How has patient tried to help current problem?  rest, meloxicam  -CP      What clinical tests have you had for this problem?  X-ray  -CP      Results of Clinical Tests  per patient, fracture healing normally , stable  -CP      History of Previous Related Injuries  denies h/o other left ankle injuries.   -CP      Are you or can you be pregnant  No  -CP         Pain     Pain Location  Ankle  -CP      Pain at Present  0  -CP      Pain at Best  0  -CP      Pain at Worst  7;8  -CP      Pain Frequency  Intermittent  -CP      Pain Description  Aching;Dull;Tender swelling  -CP      What Performance Factors Make the Current Problem(s) WORSE?  prolonged walking, standing, climbing stairs  -CP      Is your sleep disturbed?  Yes  -CP      Is medication used to assist with sleep?  Yes  -CP      What position do you sleep in?  Right sidelying;Left sidelying  -CP      Difficulties with recreational activities?  pain with walking, [place]  -CP         Fall Risk Assessment    Any falls in the past year:  Yes  -CP      Number of falls reported in the last 12 months  1  -CP      Factors that contributed to the fall:  -- when pt fractured foot  -CP         Daily Activities    Primary Language  English  -CP      Are you able to read  Yes  -CP      Are you able to write  Yes  -CP       Barriers to learning  None  -CP      Pt Participated in POC and Goals  Yes  -CP         Safety    Are you being hurt, hit, or frightened by anyone at home or in your life?  No  -CP        User Key  (r) = Recorded By, (t) = Taken By, (c) = Cosigned By    Initials Name Provider Type    CP Perkins, Corinne E, PT Physical Therapist          PT Ortho     Row Name 07/10/19 1100       Subjective Comments    Subjective Comments  Pt presents with c/o left ankle pain, s/p ORIF and hardware removal 06/24.   -CP       Subjective Pain    Able to rate subjective pain?  yes  -CP    Pre-Treatment Pain Level  0 at rest   -CP       Posture/Observations    Posture/Observations Comments  Incision healing well, swelling noted around medial malleoli.   -CP       General ROM    RT Lower Ext  Rt Ankle Dorsiflexion;Rt Ankle Plantarflexion;Rt Ankle Inversion;Rt Ankle Eversion  -CP    LT Lower Ext  Lt Ankle Dorsiflexion;Lt Ankle Inversion;Lt Ankle Eversion;Lt Ankle Plantarflexion  -CP       Right Lower Ext    Rt Ankle Dorsiflexion AROM  10  -CP    Rt Ankle Dorsiflexion PROM  14  -CP    Rt Ankle Plantarflexion AROM  50  -CP    Rt Ankle Plantarflexion PROM  60  -CP    Rt Ankle Inversion AROM  30  -CP    Rt Ankle Inversion PROM  WFL  -CP    Rt Ankle Eversion AROM  26  -CP    Rt Ankle Eversion PROM  WFL  -CP       Left Lower Ext    Lt Ankle Dorsiflexion AROM  10  -CP    Lt Ankle Dorsiflexion PROM  20  -CP    Lt Ankle Plantarflexion AROM  48  -CP    Lt Ankle Plantarflexion PROM  54  -CP    Lt Ankle Inversion AROM  24  -CP    Lt Ankle Eversion AROM  16  -CP    LT Lower Extremity Comments  denies pain with AROM  -CP       MMT (Manual Muscle Testing)    Lt Lower Ext  Lt Knee Extension;Lt Knee Flexion;Lt Ankle Plantarflexion;Lt Ankle Dorsiflexion;Lt Ankle Subtalar Inversion;Lt Ankle Subtalar Eversion  -CP    General MMT Comments  RLE generalized 4+/5  -CP       MMT Left Lower Ext    Lt Knee Extension MMT, Gross Movement  (4+/5) good plus  -CP    Lt  Knee Flexion MMT, Gross Movement  (4+/5) good plus  -CP    Lt Ankle Plantarflexion MMT, Gross Movement  (3-/5) fair minus  -CP    Lt Ankle Dorsiflexion MMT, Gross Movement  (3/5) fair  -CP    Lt Ankle Subtalar Inversion MMT, Gross Movement  (3/5) fair  -CP    Lt Ankle Subtalar Eversion MMT, Gross Movement  (3/5) fair  -CP       Sensation    Sensation WNL?  WFL  -CP    Light Touch  No apparent deficits  -CP       Flexibility    Flexibility Tested?  Lower Extremity  -CP       Lower Extremity Flexibility    Hamstrings  Left:;Mildly limited  -CP    Gastrocnemius  Left:;Moderately limited  -CP    Soleus  Left:;Moderately limited  -CP       Girth    Girth Measured?  -- swelling noted medial ankle  -CP       Balance Skills Training    SLS  LLE 1 sec  -CP    Rhomberg  20 sec LLE, 30 sec RLE intermittent UE support  -CP    Balance Comments  narrow MARSHA EO 30 sec  -CP       Gait/Stairs Assessment/Training    Handrail Location (Stairs)  right side (ascending)  -CP    Number of Steps (Stairs)  13  -CP    Ascending Technique (Stairs)  step-over-step  -CP    Descending Technique (Stairs)  step-over-step  -CP    Stairs, Impairments  pain;impaired balance;strength decreased;ROM decreased  -CP    Comment (Gait/Stairs)  Pt ambulated with step through gait pattern, decreased step length, antalgic gait pattern. Limited with DF functionally, pain with descending stairs.   -CP      User Key  (r) = Recorded By, (t) = Taken By, (c) = Cosigned By    Initials Name Provider Type    CP Perkins, Corinne E, PT Physical Therapist                      Therapy Education  Education Details: Pt educated on PT, POC, and initial HEP including gastroc and soleus stretch, toe curls, and ankle AROM, pumps. Reviewed ice use as needed.   Given: HEP, Symptoms/condition management, Pain management  Program: New  How Provided: Verbal, Demonstration, Written  Provided to: Patient  Level of Understanding: Demonstrated, Verbalized     PT OP Goals     Row Name  07/10/19 1100          PT Short Term Goals    STG Date to Achieve  08/09/19  -CP     STG 1  Patient reports foot/ankle pain is reduced by at least 25%.  -CP     STG 1 Progress  New  -CP     STG 2  Patient reports of ability to walk up/down steps with reciprocal pattern, pain less than 3/10, one handrail for support.   -CP     STG 2 Progress  New  -CP     STG 3  Patient is independent with HEP for flexibility and strengthening.  -CP     STG 3 Progress  New  -CP        Long Term Goals    LTG Date to Achieve  09/08/19  -CP     LTG 1  LEFS score is improved by at least 10 points to demonstrated improved functional mobility.  -CP     LTG 1 Progress  New  -CP     LTG 2  Patient has ankle strength of 5/5 in all planes to provide improved stability.  -CP     LTG 2 Progress  New  -CP     LTG 3  Patient will demonstrate independent HEP progressed for closed chain strength, proprioception, balance and agility with minimal or no compensations or exacerbations.  -CP     LTG 3 Progress  New  -CP     LTG 4  Patient will be able to single leg stand on L leg for 10 secs.  -CP     LTG 4 Progress  New  -CP        Time Calculation    PT Goal Re-Cert Due Date  10/08/19  -CP       User Key  (r) = Recorded By, (t) = Taken By, (c) = Cosigned By    Initials Name Provider Type    CP Perkins, Corinne E, PT Physical Therapist          PT Assessment/Plan     Row Name 07/10/19 1100          PT Assessment    Functional Limitations  Decreased safety during functional activities;Impaired gait;Limitation in home management;Limitations in community activities;Performance in leisure activities;Performance in self-care ADL  -CP     Impairments  Balance;Endurance;Gait;Impaired flexibility;Sensation;Range of motion;Pain;Muscle strength;Joint mobility  -CP     Assessment Comments  Patient is a 63 yo female referred to PT s/p left ORIF following trimarlleolar ankle fracture. She recently had hardware removed (6/24/19), WBAT. Pt demonstrated decreased left  ankle AROM, limited strength, impaired gait mechanics and impaired balance as expected post op.   -CP     Please refer to paper survey for additional self-reported information  Yes  -CP     Rehab Potential  Good  -CP     Patient/caregiver participated in establishment of treatment plan and goals  Yes  -CP     Patient would benefit from skilled therapy intervention  Yes  -CP        PT Plan    PT Frequency  2x/week  -CP     Predicted Duration of Therapy Intervention (Therapy Eval)  16 visits  -CP     Planned CPT's?  PT EVAL LOW COMPLEXITY: 61664;PT RE-EVAL: 98267;PT THER PROC EA 15 MIN: 57878;PT MANUAL THERAPY EA 15 MIN: 22980;PT NEUROMUSC RE-EDUCATION EA 15 MIN: 92088;PT GAIT TRAINING EA 15 MIN: 93194;PT HOT OR COLD PACK TREAT MCARE;PT ELECTRICAL STIM UNATTEND: ;PT ULTRASOUND EA 15 MIN: 15586;PT IONTOPHORESIS EA 15 MIN: 83944  -CP     PT Plan Comments  Recommend skilled PT as noted above to improve left ankle AROM, strength, NMRE for improved balance, swelling control, manual and modalities as indicated to decrease pain and improve functional mobility. Assess compliance with initial HEP.   -CP       User Key  (r) = Recorded By, (t) = Taken By, (c) = Cosigned By    Initials Name Provider Type    CP Perkins, Corinne E, PT Physical Therapist            Exercises     Row Name 07/10/19 1100             Subjective Comments    Subjective Comments  Pt presents with c/o left ankle pain, s/p ORIF and hardware removal 06/24.   -CP         Subjective Pain    Able to rate subjective pain?  yes  -CP      Pre-Treatment Pain Level  0 at rest   -CP        User Key  (r) = Recorded By, (t) = Taken By, (c) = Cosigned By    Initials Name Provider Type    CP Perkins, Corinne E, PT Physical Therapist                        Outcome Measure Options: Lower Extremity Functional Scale (LEFS)  Lower Extremity Functional Index  Any of your usual work, housework or school activities: A little bit of difficulty  Your usual hobbies,  recreational or sporting activities: Quite a bit of difficulty  Getting into or out of the bath: Quite a bit of difficulty  Walking between rooms: No difficulty  Putting on your shoes or socks: No difficulty  Squatting: Extreme difficulty or unable to perform activity  Lifting an object, like a bag of groceries from the floor: No difficulty  Performing light activities around your home: No difficulty  Performing heavy activities around your home: Quite a bit of difficulty  Getting into or out of a car: A little bit of difficulty  Walking 2 blocks: Quite a bit of difficulty  Walking a mile: Extreme difficulty or unable to perform activity  Going up or down 10 stairs (about 1 flight of stairs): Quite a bit of difficulty  Standing for 1 hour: Moderate difficulty  Sitting for 1 hour: No difficulty  Running on even ground: Extreme difficulty or unable to perform activity  Running on uneven ground: Extreme difficulty or unable to perform activity  Making sharp turns while running fast: Extreme difficulty or unable to perform activity  Hopping: Extreme difficulty or unable to perform activity  Rolling over in bed: No difficulty  Total: 37      Time Calculation:     Start Time: 1115     Therapy Charges for Today     Code Description Service Date Service Provider Modifiers Qty    81180550877 HC PT EVAL LOW COMPLEXITY 4 7/10/2019 Perkins, Corinne E, PT GP 1          PT G-Codes  Outcome Measure Options: Lower Extremity Functional Scale (LEFS)  Total: 37         Corinne E. Perkins, PT  7/10/2019

## 2019-07-16 ENCOUNTER — HOSPITAL ENCOUNTER (OUTPATIENT)
Dept: PHYSICAL THERAPY | Facility: HOSPITAL | Age: 64
Setting detail: THERAPIES SERIES
Discharge: HOME | End: 2019-07-16

## 2019-07-16 DIAGNOSIS — Z87.81 S/P ORIF (OPEN REDUCTION INTERNAL FIXATION) FRACTURE: Primary | ICD-10-CM

## 2019-07-16 DIAGNOSIS — Z98.890 S/P ORIF (OPEN REDUCTION INTERNAL FIXATION) FRACTURE: Primary | ICD-10-CM

## 2019-07-16 PROCEDURE — 97110 THERAPEUTIC EXERCISES: CPT | Performed by: PHYSICAL THERAPIST

## 2019-07-16 PROCEDURE — 97112 NEUROMUSCULAR REEDUCATION: CPT | Performed by: PHYSICAL THERAPIST

## 2019-07-16 NOTE — THERAPY TREATMENT NOTE
Outpatient Physical Therapy Ortho Treatment Note   [place]     Patient Name: Merry Tucker  : 1955  MRN: [id number]  Today's Date: 2019      Visit Date: 2019    Visit Dx:    ICD-10-CM ICD-9-CM   1. S/P ORIF (open reduction internal fixation) fracture Z96.7 V45.89    Z87.81 V15.51       Patient Active Problem List   Diagnosis   • Closed trimalleolar fracture of ankle with high fibular fracture   • Painful orthopaedic hardware (CMS/HCC)        Past Medical History:   Diagnosis Date   • Anxiety    • Arthritis    • Cancer (CMS/HCC)     squamous skin cancer removed    • Cataracts, bilateral    • Diverticulitis    • High cholesterol    • Hypertension    • Hypothyroid    • IBS (irritable bowel syndrome)    • PONV (postoperative nausea and vomiting)    • Stress incontinence    • Wears glasses         Past Surgical History:   Procedure Laterality Date   • ANKLE OPEN REDUCTION INTERNAL FIXATION Right     orif   • ANKLE OPEN REDUCTION INTERNAL FIXATION Left 3/20/2019    Procedure: ANKLE OPEN REDUCTION INTERNAL FIXATION LEFT;  Surgeon: Ole Dominguez MD;  Location: [facility];  Service: Orthopedics   • AUGMENTATION MAMMAPLASTY Bilateral     silicone    • COLONOSCOPY         • COSMETIC SURGERY      tummy tuck and facelift   • HARDWARE REMOVAL Left 2019    Procedure: ANKLE/FOOT HARDWARE REMOVAL;  Surgeon: Ole Dominguez MD;  Location: [facility];  Service: Orthopedics                       PT Assessment/Plan     Row Name 19 1300          PT Assessment    Assessment Comments  Pt tolerated initial ther exercises without increased pain, intermittent UE support required for balance. Educated to progress HEP to include 4 way ankle with GTB/RTB both.   -CP        PT Plan    PT Plan Comments  Assess response from added ther exercises. Progress standing CKC ther ex as tolerated along with NMRE exercises.   -CP       User Key  (r) = Recorded By, (t) = Taken By, (c) = Cosigned By     Initials Name Provider Type    CP Perkins, Corinne E, PT Physical Therapist            Exercises     Row Name 07/16/19 1300             Subjective Comments    Subjective Comments  Pt states 4/10 pain on top of foot in toes for the psat few days since she started walking. She reports compliance with current HEP.   -CP         Subjective Pain    Able to rate subjective pain?  yes  -CP      Pre-Treatment Pain Level  4  -CP      Post-Treatment Pain Level  3  -CP         Total Minutes    99448 - PT Therapeutic Exercise Minutes  23  -CP      33229 -  PT Neuromuscular Reeducation Minutes  12  -CP      20470 - PT Manual Therapy Minutes  3  -CP         Exercise 1    Exercise Name 1  LE bike Level 2  -CP      Time 1  4 minutes  -CP         Exercise 2    Exercise Name 2  TG Squats  -CP      Cueing 2  Verbal;Demo  -CP      Reps 2  20x  -CP         Exercise 3    Exercise Name 3  TG heel raises, toe raises  -CP      Cueing 3  Verbal  -CP      Reps 3  12x each  -CP         Exercise 4    Exercise Name 4  standing NMRE: narrow MARSHA EO/EC on level surface and airex, mod tandem stance EO/EC, tandem stance EO/EC, SLS holds EO only   -CP      Cueing 4  Verbal;Demo  -CP      Reps 4  2x each  -CP      Time 4  30 sec holds  -CP      Additional Comments   intermittent UE support as needed  -CP         Exercise 5    Exercise Name 5  SLS cone taps   -CP      Cueing 5  Verbal;Demo  -CP      Reps 5  10x each  -CP      Additional Comments  intermittent UE support  -CP         Exercise 6    Exercise Name 6  standing heel raises, standing toe raises  -CP      Cueing 6  Verbal;Demo  -CP      Reps 6  12x each  -CP         Exercise 7    Exercise Name 7  ankle 4 way with GTB DF/PF, RTB inv/ryan  -CP      Reps 7  12x each  -CP         Exercise 8    Exercise Name 8  gastroc, soleus stretch  -CP      Cueing 8  Verbal  -CP      Reps 8  2x each  -CP         Exercise 9    Exercise Name 9  side stepping, zig zag stepping with RTB above knees  -CP      Reps  9  2 x 30 ft each  -CP      Additional Comments  RTB  -CP        User Key  (r) = Recorded By, (t) = Taken By, (c) = Cosigned By    Initials Name Provider Type    CP Perkins, Corinne E, PT Physical Therapist                      Manual Rx (last 36 hours)      Manual Treatments     Row Name 07/16/19 1300             Total Minutes    11709 - PT Manual Therapy Minutes  3  -CP         Manual Rx 1    Manual Rx 1 Location  left ankle  -CP      Manual Rx 1 Type  subtalar distraction, OP with DF stretch, mobs for mobility pain free; pt hooklying  -CP      Manual Rx 1 Duration  3  -CP        User Key  (r) = Recorded By, (t) = Taken By, (c) = Cosigned By    Initials Name Provider Type    CP Perkins, Corinne E, PT Physical Therapist                             Time Calculation:   Start Time: 1301  Total Timed Code Minutes- PT: 38 minute(s)  Therapy Charges for Today     Code Description Service Date Service Provider Modifiers Qty    35280248686 HC PT NEUROMUSC RE EDUCATION EA 15 MIN 7/16/2019 Perkins, Corinne E, PT GP 1    85926293643 HC PT THER PROC EA 15 MIN 7/16/2019 Perkins, Corinne E, PT GP 2                    Corinne E. Perkins, PT  7/16/2019

## 2019-07-18 ENCOUNTER — HOSPITAL ENCOUNTER (OUTPATIENT)
Dept: PHYSICAL THERAPY | Facility: HOSPITAL | Age: 64
Setting detail: THERAPIES SERIES
Discharge: HOME | End: 2019-07-18

## 2019-07-18 DIAGNOSIS — Z87.81 S/P ORIF (OPEN REDUCTION INTERNAL FIXATION) FRACTURE: Primary | ICD-10-CM

## 2019-07-18 DIAGNOSIS — Z98.890 S/P ORIF (OPEN REDUCTION INTERNAL FIXATION) FRACTURE: Primary | ICD-10-CM

## 2019-07-18 PROCEDURE — 97112 NEUROMUSCULAR REEDUCATION: CPT | Performed by: PHYSICAL THERAPIST

## 2019-07-18 PROCEDURE — 97140 MANUAL THERAPY 1/> REGIONS: CPT | Performed by: PHYSICAL THERAPIST

## 2019-07-18 PROCEDURE — 97110 THERAPEUTIC EXERCISES: CPT | Performed by: PHYSICAL THERAPIST

## 2019-07-18 NOTE — THERAPY TREATMENT NOTE
Outpatient Physical Therapy Ortho Treatment Note   [place]     Patient Name: Merry Tucker  : 1955  MRN: [id number]  Today's Date: 2019      Visit Date: 2019    Visit Dx:    ICD-10-CM ICD-9-CM   1. S/P ORIF (open reduction internal fixation) fracture Z96.7 V45.89    Z87.81 V15.51       Patient Active Problem List   Diagnosis   • Closed trimalleolar fracture of ankle with high fibular fracture   • Painful orthopaedic hardware (CMS/HCC)        Past Medical History:   Diagnosis Date   • Anxiety    • Arthritis    • Cancer (CMS/HCC)     squamous skin cancer removed    • Cataracts, bilateral    • Diverticulitis    • High cholesterol    • Hypertension    • Hypothyroid    • IBS (irritable bowel syndrome)    • PONV (postoperative nausea and vomiting)    • Stress incontinence    • Wears glasses         Past Surgical History:   Procedure Laterality Date   • ANKLE OPEN REDUCTION INTERNAL FIXATION Right     orif   • ANKLE OPEN REDUCTION INTERNAL FIXATION Left 3/20/2019    Procedure: ANKLE OPEN REDUCTION INTERNAL FIXATION LEFT;  Surgeon: Ole Dominguez MD;  Location: [facility];  Service: Orthopedics   • AUGMENTATION MAMMAPLASTY Bilateral     silicone    • COLONOSCOPY         • COSMETIC SURGERY      tummy tuck and facelift   • HARDWARE REMOVAL Left 2019    Procedure: ANKLE/FOOT HARDWARE REMOVAL;  Surgeon: Ole Dominguez MD;  Location: [facility];  Service: Orthopedics                       PT Assessment/Plan     Row Name 19 1100          PT Assessment    Assessment Comments  Moderate tenderness noted along left medial gastroc, improved with manual techniques. She verbalized compliance with current HEP.   -CP        PT Plan    PT Plan Comments  Assess response from manual techniques. Resume progressed NMRE exercises as tolerated next visit.   -CP       User Key  (r) = Recorded By, (t) = Taken By, (c) = Cosigned By    Initials Name Provider Type    CP Perkins, Corinne E,  PT Physical Therapist            Exercises     Row Name 07/18/19 1100             Subjective Comments    Subjective Comments  Pt reports increased soreness after last visit, states went away within 24 hours.   -CP         Subjective Pain    Able to rate subjective pain?  yes  -CP      Pre-Treatment Pain Level  3  -CP      Post-Treatment Pain Level  2  -CP         Total Minutes    67235 - PT Therapeutic Exercise Minutes  20  -CP      81690 -  PT Neuromuscular Reeducation Minutes  10  -CP      41491 - PT Manual Therapy Minutes  10  -CP         Exercise 1    Exercise Name 1  LE bike Level 2  -CP      Time 1  5 minutes  -CP         Exercise 2    Exercise Name 2  TG Squats  -CP      Reps 2  20x  -CP         Exercise 3    Exercise Name 3  TG heel raises, toe raises  -CP      Reps 3  15x each  -CP         Exercise 4    Exercise Name 4  standing NMRE: narrow MARSHA EO/EC on level surface and airex, mod tandem stance EO/EC, tandem stance EO/EC, SLS holds EO only   -CP      Reps 4  held today  -CP         Exercise 5    Exercise Name 5  wobble board DF/PF, inversion/eversion  -CP      Reps 5  15x each followed by midline holds  -CP      Additional Comments  30 sec midline holds for balance.   -CP         Exercise 6    Exercise Name 6  standing heel raises, standing toe raises  -CP      Reps 6  12x each  -CP         Exercise 7    Exercise Name 7  ankle 4 way with GTB DF/PF, RTB inv/ryan  -CP      Reps 7  15x each  -CP      Additional Comments  inversion/eversion seated  -CP         Exercise 8    Exercise Name 8  gastroc, soleus stretch  -CP      Reps 8  3x each  -CP         Exercise 9    Exercise Name 9  side stepping, zig zag stepping with GTB above knees  -CP      Reps 9  2 x 30 ft each  -CP      Additional Comments  GTB  -CP        User Key  (r) = Recorded By, (t) = Taken By, (c) = Cosigned By    Initials Name Provider Type    CP Perkins, Corinne E, PT Physical Therapist                      Manual Rx (last 36 hours)      Manual  Treatments     Row Name 07/18/19 1100             Total Minutes    89561 - PT Manual Therapy Minutes  10  -CP         Manual Rx 2    Manual Rx 2 Location  left ankle, left gastroc medial and lateral  -CP      Manual Rx 2 Type  iaSTM techniques along left lower leg  -CP      Manual Rx 2 Duration  10  -CP        User Key  (r) = Recorded By, (t) = Taken By, (c) = Cosigned By    Initials Name Provider Type    CP Perkins, Corinne E, PT Physical Therapist                             Time Calculation:   Start Time: 1103  Total Timed Code Minutes- PT: 40 minute(s)  Therapy Charges for Today     Code Description Service Date Service Provider Modifiers Qty    46472376439 HC PT NEUROMUSC RE EDUCATION EA 15 MIN 7/18/2019 Perkins, Corinne E, PT GP 1    45443679351 HC PT THER PROC EA 15 MIN 7/18/2019 Perkins, Corinne E, PT GP 1    67075099078 HC PT MANUAL THERAPY EA 15 MIN 7/18/2019 Perkins, Corinne E, PT GP 1                    Corinne E. Perkins, PT  7/18/2019

## 2019-07-23 ENCOUNTER — HOSPITAL ENCOUNTER (OUTPATIENT)
Dept: PHYSICAL THERAPY | Facility: HOSPITAL | Age: 64
Setting detail: THERAPIES SERIES
Discharge: HOME | End: 2019-07-23

## 2019-07-23 DIAGNOSIS — Z98.890 S/P ORIF (OPEN REDUCTION INTERNAL FIXATION) FRACTURE: Primary | ICD-10-CM

## 2019-07-23 DIAGNOSIS — Z87.81 S/P ORIF (OPEN REDUCTION INTERNAL FIXATION) FRACTURE: Primary | ICD-10-CM

## 2019-07-23 PROCEDURE — 97112 NEUROMUSCULAR REEDUCATION: CPT | Performed by: PHYSICAL THERAPIST

## 2019-07-23 PROCEDURE — 97140 MANUAL THERAPY 1/> REGIONS: CPT | Performed by: PHYSICAL THERAPIST

## 2019-07-23 PROCEDURE — 97110 THERAPEUTIC EXERCISES: CPT | Performed by: PHYSICAL THERAPIST

## 2019-07-23 NOTE — THERAPY TREATMENT NOTE
Outpatient Physical Therapy Ortho Treatment Note   Jose     Patient Name: Merry Tucker  : 1955  MRN: [id number]  Today's Date: 2019      Visit Date: 2019    Visit Dx:    ICD-10-CM ICD-9-CM   1. S/P ORIF (open reduction internal fixation) fracture Z96.7 V45.89    Z87.81 V15.51       Patient Active Problem List   Diagnosis   • Closed trimalleolar fracture of ankle with high fibular fracture   • Painful orthopaedic hardware (CMS/HCC)        Past Medical History:   Diagnosis Date   • Anxiety    • Arthritis    • Cancer (CMS/HCC)     squamous skin cancer removed    • Cataracts, bilateral    • Diverticulitis    • High cholesterol    • Hypertension    • Hypothyroid    • IBS (irritable bowel syndrome)    • PONV (postoperative nausea and vomiting)    • Stress incontinence    • Wears glasses         Past Surgical History:   Procedure Laterality Date   • ANKLE OPEN REDUCTION INTERNAL FIXATION Right     orif   • ANKLE OPEN REDUCTION INTERNAL FIXATION Left 3/20/2019    Procedure: ANKLE OPEN REDUCTION INTERNAL FIXATION LEFT;  Surgeon: Ole Dominguez MD;  Location: [facility];  Service: Orthopedics   • AUGMENTATION MAMMAPLASTY Bilateral     silicone    • COLONOSCOPY         • COSMETIC SURGERY      tummy tuck and facelift   • HARDWARE REMOVAL Left 2019    Procedure: ANKLE/FOOT HARDWARE REMOVAL;  Surgeon: Ole Dominguez MD;  Location: [facility] [place];  Service: Orthopedics                       PT Assessment/Plan     Row Name 19 1300          PT Assessment    Assessment Comments  Held progression of NMRE exercises d/t pain. Pt demonstrates improved gait mechanics on level and unlevel surfaces, increased stride length noted.   -CP        PT Plan    PT Plan Comments  F/u next week with progression of strengthening ankle ther exercises and NMRE as tolerated. Could trial [facility] DF lunge mob next visit.   -CP       User Key  (r) = Recorded By, (t) = Taken By, (c) = Cosigned By     Initials Name Provider Type    CP Perkins, Corinne E, PT Physical Therapist            Exercises     Row Name 07/23/19 1300             Subjective Comments    Subjective Comments  Pt states she was sore after last visit, [place]. She states soreness takes a few days to improve.   -CP         Subjective Pain    Able to rate subjective pain?  yes  -CP      Pre-Treatment Pain Level  2  -CP      Post-Treatment Pain Level  2  -CP         Total Minutes    85087 - PT Therapeutic Exercise Minutes  18  -CP      13482 -  PT Neuromuscular Reeducation Minutes  12  -CP      65494 - PT Manual Therapy Minutes  9  -CP         Exercise 1    Exercise Name 1  Walking outside on unlevel surfaces (incline/decline, in grass)  -CP      Time 1  3 minutes  -CP         Exercise 2    Exercise Name 2  foot intrinsics with marble  along with inversion/eversion  -CP      Reps 2  2x each  -CP         Exercise 4    Exercise Name 4  standing NMRE: narrow MARSHA EO/EC on level surface and airex, mod tandem stance EO/EC, tandem stance EO/EC, SLS holds EO only   -CP      Reps 4  2x each  -CP      Time 4  30 sec holds  -CP         Exercise 5    Exercise Name 5  stairs up/down  -CP      Sets 5  2  -CP      Reps 5  13  -CP      Additional Comments  1 hand for UE support descending  -CP         Exercise 6    Exercise Name 6  standing heel raises, standing toe raises  -CP      Reps 6  15  -CP         Exercise 7    Exercise Name 7  ankle 4 way with GTB DF/PF, RTB inv/ryan  -CP      Reps 7  15x each  -CP         Exercise 8    Exercise Name 8  gastroc, soleus stretch  -CP      Reps 8  3x each  -CP         Exercise 9    Exercise Name 9  side stepping, zig zag stepping with GTB above knees  -CP      Reps 9  2 x 30 ft each  -CP      Additional Comments  GTB  -CP        User Key  (r) = Recorded By, (t) = Taken By, (c) = Cosigned By    Initials Name Provider Type    CP Perkins, Corinne  MANNY, PT Physical Therapist                      Manual Rx (last 36 hours)      Manual Treatments     Row Name 07/23/19 1300             Total Minutes    28595 - PT Manual Therapy Minutes  9  -CP         Manual Rx 2    Manual Rx 2 Location  left ankle, left gastroc medial and lateral  -CP      Manual Rx 2 Type  iASTM techniques along left lower leg  -CP      Manual Rx 2 Duration  9  -CP        User Key  (r) = Recorded By, (t) = Taken By, (c) = Cosigned By    Initials Name Provider Type    CP Perkins, Corinne E, PT Physical Therapist                             Time Calculation:   Start Time: 1301  Total Timed Code Minutes- PT: 39 minute(s)  Therapy Charges for Today     Code Description Service Date Service Provider Modifiers Qty    63037538807 HC PT NEUROMUSC RE EDUCATION EA 15 MIN 7/23/2019 Perkins, Corinne E, PT GP 1    70315655260 HC PT THER PROC EA 15 MIN 7/23/2019 Perkins, Corinne E, PT GP 1    17484870826 HC PT MANUAL THERAPY EA 15 MIN 7/23/2019 Perkins, Corinne E, PT GP 1                    Corinne E. Perkins, PT  7/23/2019

## 2019-07-30 ENCOUNTER — HOSPITAL ENCOUNTER (OUTPATIENT)
Dept: PHYSICAL THERAPY | Facility: HOSPITAL | Age: 64
Setting detail: THERAPIES SERIES
Discharge: HOME | End: 2019-07-30

## 2019-07-30 DIAGNOSIS — Z98.890 S/P ORIF (OPEN REDUCTION INTERNAL FIXATION) FRACTURE: Primary | ICD-10-CM

## 2019-07-30 DIAGNOSIS — Z87.81 S/P ORIF (OPEN REDUCTION INTERNAL FIXATION) FRACTURE: Primary | ICD-10-CM

## 2019-07-30 PROCEDURE — 97032 APPL MODALITY 1+ESTIM EA 15: CPT | Performed by: PHYSICAL THERAPIST

## 2019-07-30 PROCEDURE — 97112 NEUROMUSCULAR REEDUCATION: CPT | Performed by: PHYSICAL THERAPIST

## 2019-07-30 PROCEDURE — 97110 THERAPEUTIC EXERCISES: CPT | Performed by: PHYSICAL THERAPIST

## 2019-07-30 NOTE — THERAPY TREATMENT NOTE
Outpatient Physical Therapy Ortho Treatment Note   Jose     Patient Name: Merry Tucker  : 1955  MRN: [id number]  Today's Date: 2019      Visit Date: 2019    Visit Dx:    ICD-10-CM ICD-9-CM   1. S/P ORIF (open reduction internal fixation) fracture Z96.7 V45.89    Z87.81 V15.51       Patient Active Problem List   Diagnosis   • Closed trimalleolar fracture of ankle with high fibular fracture   • Painful orthopaedic hardware (CMS/HCC)        Past Medical History:   Diagnosis Date   • Anxiety    • Arthritis    • Cancer (CMS/HCC)     squamous skin cancer removed    • Cataracts, bilateral    • Diverticulitis    • High cholesterol    • Hypertension    • Hypothyroid    • IBS (irritable bowel syndrome)    • PONV (postoperative nausea and vomiting)    • Stress incontinence    • Wears glasses         Past Surgical History:   Procedure Laterality Date   • ANKLE OPEN REDUCTION INTERNAL FIXATION Right     orif   • ANKLE OPEN REDUCTION INTERNAL FIXATION Left 3/20/2019    Procedure: ANKLE OPEN REDUCTION INTERNAL FIXATION LEFT;  Surgeon: Ole Dominguez MD;  Location: [facility];  Service: Orthopedics   • AUGMENTATION MAMMAPLASTY Bilateral     silicone    • COLONOSCOPY         • COSMETIC SURGERY      tummy tuck and facelift   • HARDWARE REMOVAL Left 2019    Procedure: ANKLE/FOOT HARDWARE REMOVAL;  Surgeon: Ole Dominguez MD;  Location: [facility];  Service: Orthopedics                       PT Assessment/Plan     Row Name 19 1300          PT Assessment    Assessment Comments  Pt verbalized pain improvement following trial of estim today. Tolerated progression of manual techniques in clinic for DF mobs.   -CP        PT Plan    PT Plan Comments  Assess response from progressed manual techniques and trial of estim. Progress written HEP next visit. Review stairs.   -CP       User Key  (r) = Recorded By, (t) = Taken By, (c) = Cosigned By    Initials Name Provider Type    CP  Perkins, Corinne E, PT Physical Therapist          Modalities     Row Name 07/30/19 1300             ELECTRICAL STIMULATION    Attended/Unattended  Unattended pt supine  -CP      Stimulation Type  HVGS  -CP      Location/Electrode Placement/Other  left ankle  -CP      79419 - PT E-Stim Attended (Manual) Minutes  10  -CP        User Key  (r) = Recorded By, (t) = Taken By, (c) = Cosigned By    Initials Name Provider Type    CP Perkins, Corinne E, PT Physical Therapist        Exercises     Row Name 07/30/19 1300             Subjective Comments    Subjective Comments  Pt states she has appt with Dr. Guevara tomorrow for her right ankle pain, states possible sural nerve related.   -CP         Subjective Pain    Able to rate subjective pain?  yes  -CP      Pre-Treatment Pain Level  3  -CP      Post-Treatment Pain Level  2  -CP         Total Minutes    77989 - PT Therapeutic Exercise Minutes  22  -CP      75585 -  PT Neuromuscular Reeducation Minutes  8  -CP      92391 - PT Manual Therapy Minutes  5  -CP         Exercise 1    Exercise Name 1  Nustep Level 5  -CP      Time 1  5 minutes  -CP         Exercise 2    Exercise Name 2  foot intrinsics with marble  along with inversion/eversion  -CP      Reps 2  2x each  -CP         Exercise 3    Exercise Name 3  standing mod squats for functional DF  -CP      Reps 3  10x  -CP      Additional Comments  in parallel bars for UE support  -CP         Exercise 4    Exercise Name 4  standing NMRE: narrow MARSHA EO/EC on level surface and airex, mod tandem stance EO/EC, tandem stance EO/EC, SLS holds EO only   -CP      Reps 4  2x each  -CP      Time 4  30 sec holds  -CP         Exercise 5    Exercise Name 5  annette walking (forward, lateral) with SLS pause and head turns (holding 2lb ball)  -CP      Reps 5  4x each through each direction  -CP      Additional Comments  forward walking, lateral walking  -CP         Exercise 6    Exercise Name 6  standing heel raises, standing toe raises   -CP      Reps 6  15  -CP         Exercise 7    Exercise Name 7  standing heel raises, toe raises  -CP      Reps 7  15x each  -CP      Additional Comments  off stepper for heel raises  -CP         Exercise 8    Exercise Name 8  gastroc, soleus stretch  -CP      Reps 8  3x each  -CP         Exercise 9    Exercise Name 9  forward lunges onto step for DF stretch  -CP      Sets 9  2  -CP      Reps 9  8  -CP      Additional Comments  mod forward lunge position  -CP        User Key  (r) = Recorded By, (t) = Taken By, (c) = Cosigned By    Initials Name Provider Type    CP Perkins, Corinne E, PT Physical Therapist                      Manual Rx (last 36 hours)      Manual Treatments     Row Name 07/30/19 1300             Total Minutes    16198 - PT Manual Therapy Minutes  5  -CP         Manual Rx 1    Manual Rx 1 Location  left ankle  -CP      Manual Rx 1 Type  standing DF mobs with mob belt for improved DF AROM; followed by subtalar distraction , OP with DF stretch.   -CP      Manual Rx 1 Duration  5  -CP        User Key  (r) = Recorded By, (t) = Taken By, (c) = Cosigned By    Initials Name Provider Type    CP Perkins, Corinne E, PT Physical Therapist                             Time Calculation:   Start Time: 1301  Total Timed Code Minutes- PT: 45 minute(s)  Therapy Charges for Today     Code Description Service Date Service Provider Modifiers Qty    51826352782 HC PT NEUROMUSC RE EDUCATION EA 15 MIN 7/30/2019 Perkins, Corinne E, PT GP 1    84523185559 HC PT THER PROC EA 15 MIN 7/30/2019 Perkins, Corinne E, PT GP 1    26457519423 HC PT ELEC STIM EA-PER 15 MIN 7/30/2019 Perkins, Corinne E, PT GP 1                    Corinne E. Perkins, PT  7/30/2019

## 2019-07-31 ENCOUNTER — OFFICE VISIT (OUTPATIENT)
Dept: ORTHOPEDIC SURGERY | Facility: CLINIC | Age: 64
End: 2019-07-31

## 2019-07-31 VITALS — HEIGHT: 64 IN | HEART RATE: 98 BPM | OXYGEN SATURATION: 98 % | BODY MASS INDEX: 24.24 KG/M2 | WEIGHT: 141.98 LBS

## 2019-07-31 DIAGNOSIS — Z98.890 STATUS POST OPEN REDUCTION WITH INTERNAL FIXATION (ORIF) OF FRACTURE OF ANKLE: Primary | ICD-10-CM

## 2019-07-31 DIAGNOSIS — Z87.81 STATUS POST OPEN REDUCTION WITH INTERNAL FIXATION (ORIF) OF FRACTURE OF ANKLE: Primary | ICD-10-CM

## 2019-07-31 DIAGNOSIS — M72.2 PLANTAR FASCIITIS: ICD-10-CM

## 2019-07-31 DIAGNOSIS — I87.8 VENOUS STASIS: ICD-10-CM

## 2019-07-31 PROCEDURE — 99214 OFFICE O/P EST MOD 30 MIN: CPT | Performed by: ORTHOPAEDIC SURGERY

## 2019-07-31 NOTE — PROGRESS NOTES
"NEW PATIENT    Patient: Merry Tucker  : 1955    Primary Care Provider: Bayron Ware MD    Requesting Provider: As above    Pain of the Right Foot      History    Chief Complaint: Right foot pain    History of Present Illness: This is a very pleasant 64-year-old woman here today for second opinion regarding 2-year history of right foot pain.  She reports that it started as heel pain.  Heel pain was worse in the morning and on arising from a seated position.  It became painful every time she stood on it.  She was initially treated for plantar fasciitis with orthotics and steroid injection.  I went through the podiatry records that she had, they are somewhat confusing.  Throughout each office visit exam.  Indicates that ankle joints bilaterally are contracted yet also indicates that bilateral ankles are unstable and weak.  She had an MRI done on 10/3/2018.  I have the report but not the disc.  It indicated plantar fasciitis and varicosities medially which were reported as being possible to contribute to neurologic impingement.  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  She reports that the pain is in her heel.  Its worse on arising, it varies from 3-8 out of 10.  In the notes it indicates that she had several steroid injections for possible tarsal tunnel.  She does not think these helped.  She then saw Dr. Camacho Sosa anesthesia pain management, he thought she had sural nerve problem.  She reports he did blocks and she thinks they helped a little bit.  Again however the pain is reproducibly on the plantar surface of the foot.  It may be a " little more lateral than medial now, but it is in the heel.  She had surgery done in the office 10/24/2018, its reported as a plantar fasciotomy.  She has a small transverse scar about a centimeter distal to the origin the plantar fascia with some faint thickening of the plantar fascia palpable.  She is here for another opinion.    She reports that her symptoms are predominantly pain in the plantar heel.  She has done a runner stretch, once a day, she has not had a night splint.  She saw Dr. Dominguez recently with a left ankle fracture.  She had a right ankle fracture in the past treated in [place].  Her x-rays are interesting, it is evident that the fibula fracture must of had a delayed union, the fibular plate is broken, but then there is bridging callus over this.    She does note some swelling in her legs, she does not wear support stockings.  She works as a .  Current Outpatient Medications on File Prior to Visit   Medication Sig Dispense Refill   • CALCIUM CITRATE-VITAMIN D PO Take 1,000 mg by mouth.     • Cholecalciferol (VITAMIN D PO) Take  by mouth.     • FLUoxetine (PROzac) 40 MG capsule      • levothyroxine (SYNTHROID, LEVOTHROID) 50 MCG tablet TAKE 1 TABLET BY MOUTH IN THE MORNING on an empty stomach. do not eat for 30 minutes  1   • lisinopril (PRINIVIL,ZESTRIL) 5 MG tablet TAKE 1 TABLET BY MOUTH IN THE MORNING FOR BLOOD PRESSURE  1   • meloxicam (MOBIC) 15 MG tablet Take 15 mg by mouth Daily.     • pravastatin (PRAVACHOL) 20 MG tablet Take 20 mg by mouth every night at bedtime.  1   • vitamin D (ERGOCALCIFEROL) 52290 units capsule capsule Take 50,000 Units by mouth 1 (One) Time Per Week.  0   • zolpidem (AMBIEN) 10 MG tablet Take 5 mg by mouth every night at bedtime. Patient states she takes 1/3 of 10 mg tab  2     No current facility-administered medications on file prior to visit.       Allergies   Allergen Reactions   • Penicillins Rash     Has pedro luis. Keflex in the past      Past Medical  History:   Diagnosis Date   • Anxiety    • Arthritis    • Cancer (CMS/HCC)     squamous skin cancer removed    • Cataracts, bilateral    • Diverticulitis    • High cholesterol    • Hypertension    • Hypothyroid    • IBS (irritable bowel syndrome)    • PONV (postoperative nausea and vomiting)    • Stress incontinence    • Wears glasses      Past Surgical History:   Procedure Laterality Date   • ANKLE OPEN REDUCTION INTERNAL FIXATION Right 2003    orif   • ANKLE OPEN REDUCTION INTERNAL FIXATION Left 3/20/2019    Procedure: ANKLE OPEN REDUCTION INTERNAL FIXATION LEFT;  Surgeon: Ole Dominguez MD;  Location:  [facility];  Service: Orthopedics   • AUGMENTATION MAMMAPLASTY Bilateral 1987    silicone    • COLONOSCOPY      2017   • COSMETIC SURGERY      tummy tuck and facelift   • HARDWARE REMOVAL Left 6/24/2019    Procedure: ANKLE/FOOT HARDWARE REMOVAL;  Surgeon: Ole Dominguez MD;  Location:  [facility];  Service: Orthopedics     Family History   Problem Relation Age of Onset   • Cancer Mother    • Hypertension Mother    • Cancer Father    • Hypertension Father    • Heart attack Father    • Breast cancer Neg Hx    • Ovarian cancer Neg Hx       Social History     Socioeconomic History   • Marital status:      Spouse name: Not on file   • Number of children: Not on file   • Years of education: Not on file   • Highest education level: Not on file   Tobacco Use   • Smoking status: Never Smoker   • Smokeless tobacco: Never Used   Substance and Sexual Activity   • Alcohol use: Yes     Comment: social   • Drug use: No   • Sexual activity: Defer        Review of Systems   Constitutional: Positive for activity change.   HENT: Negative.    Eyes: Negative.    Respiratory: Negative.    Cardiovascular: Negative.    Gastrointestinal: Negative.    Endocrine: Negative.    Genitourinary: Negative.    Musculoskeletal: Positive for joint swelling.   Skin: Negative.    Allergic/Immunologic: Negative.    Neurological: Negative.   "  Hematological: Negative.    Psychiatric/Behavioral: The patient is nervous/anxious.        The following portions of the patient's history were reviewed and updated as appropriate: allergies, current medications, past family history, past medical history, past social history, past surgical history and problem list.    Physical Exam:   Pulse 98   Ht 161.5 cm (63.58\")   Wt 64.4 kg (141 lb 15.6 oz)   SpO2 98%   Breastfeeding? No   BMI 24.69 kg/m²   GENERAL: Body habitus: overweight, slightly    Lower extremity edema: Right: 1+ pitting; Leftt: 2+ pitting    Varicose veins:  Right: mild; Left: mild    Gait: antalgic     Mental Status:  awake and alert; oriented to person, place, and time    Voice:  clear  SKIN:  warm and dry    Hair Growth:  Right:normal; Left:  normal  NAILS: Toenails: normal  HEENT: Head: Normocephalic, atraumatic,  without obvious abnormality.  eye: normal external eye, no icterus  ears: normal external ears  nose: normal external nose  pharynx: dental hygiene adequate  PULM:  Repiratory effort normal  CV:  Dorsalis Pedis:  Right: 2+; Left:2+    Posterior Tibial: Right:2+; Left:2+    Capillary Refill:  Brisk  MSK:  Hand:right handed and mild arthritis, Heberden's nodes      Tibia:  Right:  tender over subcutaneous border; Left:  tender over subcutaneous border      Ankle:  Right: Medial and lateral incisions healed, hardware is palpable but not tender, nontender in the ankle joint itself.  Dorsiflexion 10 degrees plantarflexion 40 degrees 10 degrees inversion eversion.  Mildly positive Tinel's just posterior and inferior to the medial malleolus, the Tinel's is focal, since tingling into the plantar forefoot no mass palpable not tender here; Left:  Moderate postop edema as expected, incisions healed      Foot:  Right:  Tender at the origin of the plantar fascia across the heel, little more tender laterally than medially, I can feel slight thickening in the medial plantar fascia where she had the " release.  Otherwise nontender; Left:  non tender      NEURO:      Magnolia-Christopher 5.07 monofilament test: normal    Lower extremity sensation: intact     Reflexes:  Biceps:  Right:  2+; Left:  2+           Quads:  Right:  2+; Left:  2+           Ankle:  Right:  1+; Left:  1+      Calf Atrophy:none    Motor Function: all 5/5         Medical Decision Making    Data Review:   ordered and reviewed x-rays today, reviewed radiology results and reviewed outside records    Assessment and Plan/ Diagnosis/Treatment options:   1.  Plantar fasciitis  I think the primary problem is plantar fasciitis.  She does indeed have some positive Tinel's over the tarsal tunnel, but she is not really describing tarsal tunnel symptoms.  The pain is not throughout the plantar foot, its in the heel.  That positive Tinel's may have been present for many years, even dating back to the right ankle fracture.  It also may be due to the varicosities noted in the tarsal tunnel from last year.  She has had numerous treatments for the right plantar fasciitis, but has never done the toe pull stretch, and has not had a night splint.  The plantar fasciotomy was not successful.  The injections have not been successful.  The orthotics have not worked.  I went over plan a fasciitis in great detail with her.  I explained the bowstring mechanism etc.  I explained the orthopedic foot and ankle research project looking at this.  I would recommend the stretching and night splinting.  I explained it can take 4 months for this to resolve.  May take even longer given the length of time she has had the symptoms.  I showed her how to do the stretches and emphasized the toe pull stretch.  She needs to try and do this 100 times a day.  We gave her a night splint.  Because of all the other difficulties that she has had and all the other treatments I think it is reasonable to repeat the MRI to make sure were not missing anything.  We will do this on the magnet here at  [facility].  I will see her following the MRI  - MRI Ankle Right Without Contrast; Future    2. Venous stasis  I explained edema and venous stasis to the patient, and the often hereditary nature of the problem, and the contribution of weight, trauma, etc. I explained how they cause edema (due to gravity, etc) I explained how they can lead to ulceration.  I explained how they can cause pain, stiffness, aching, cramping, etc. I explained that it is a very very common problem, so common that even [employer] markets compression stockings.  I recommend wearing support stockings (compression stockings) daily, we discussed what type and where to find them

## 2019-08-01 ENCOUNTER — HOSPITAL ENCOUNTER (OUTPATIENT)
Dept: PHYSICAL THERAPY | Facility: HOSPITAL | Age: 64
Setting detail: THERAPIES SERIES
Discharge: HOME | End: 2019-08-01

## 2019-08-01 DIAGNOSIS — Z87.81 S/P ORIF (OPEN REDUCTION INTERNAL FIXATION) FRACTURE: Primary | ICD-10-CM

## 2019-08-01 DIAGNOSIS — Z98.890 S/P ORIF (OPEN REDUCTION INTERNAL FIXATION) FRACTURE: Primary | ICD-10-CM

## 2019-08-01 PROCEDURE — 97112 NEUROMUSCULAR REEDUCATION: CPT | Performed by: PHYSICAL THERAPIST

## 2019-08-01 PROCEDURE — 97110 THERAPEUTIC EXERCISES: CPT | Performed by: PHYSICAL THERAPIST

## 2019-08-01 NOTE — THERAPY TREATMENT NOTE
Outpatient Physical Therapy Ortho Treatment Note   Jose     Patient Name: Merry Tucker  : 1955  MRN: [id number]  Today's Date: 2019      Visit Date: 2019    Visit Dx:    ICD-10-CM ICD-9-CM   1. S/P ORIF (open reduction internal fixation) fracture Z96.7 V45.89    Z87.81 V15.51       Patient Active Problem List   Diagnosis   • Closed trimalleolar fracture of ankle with high fibular fracture   • Painful orthopaedic hardware (CMS/HCC)   • Venous stasis   • Plantar fasciitis   • Status post open reduction with internal fixation (ORIF) of fracture of ankle        Past Medical History:   Diagnosis Date   • Anxiety    • Arthritis    • Cancer (CMS/HCC)     squamous skin cancer removed    • Cataracts, bilateral    • Diverticulitis    • High cholesterol    • Hypertension    • Hypothyroid    • IBS (irritable bowel syndrome)    • PONV (postoperative nausea and vomiting)    • Stress incontinence    • Wears glasses         Past Surgical History:   Procedure Laterality Date   • ANKLE OPEN REDUCTION INTERNAL FIXATION Right     orif   • ANKLE OPEN REDUCTION INTERNAL FIXATION Left 3/20/2019    Procedure: ANKLE OPEN REDUCTION INTERNAL FIXATION LEFT;  Surgeon: Ole Dominguez MD;  Location:  [facility];  Service: Orthopedics   • AUGMENTATION MAMMAPLASTY Bilateral     silicone    • COLONOSCOPY         • COSMETIC SURGERY      tummy tuck and facelift   • HARDWARE REMOVAL Left 2019    Procedure: ANKLE/FOOT HARDWARE REMOVAL;  Surgeon: Ole Dominguez MD;  Location:  [facility];  Service: Orthopedics                       PT Assessment/Plan     Row Name 19 1300          PT Assessment    Assessment Comments  Improved functional L ankle DF noted with stairs. Pt denies change in ankle pain overall.   -CP        PT Plan    PT Plan Comments  Reassessment next visit.   -CP       User Key  (r) = Recorded By, (t) = Taken By, (c) = Cosigned By    Initials Name Provider Type    JAGJIT Gross  Corinne E, PT Physical Therapist            Exercises     Row Name 08/01/19 1300             Subjective Comments    Subjective Comments  Pt states she can tell an improvement in ankle mobility descending stairs.   -CP         Subjective Pain    Able to rate subjective pain?  yes  -CP      Pre-Treatment Pain Level  2  -CP      Post-Treatment Pain Level  2  -CP         Total Minutes    03901 - PT Therapeutic Exercise Minutes  23  -CP      56093 -  PT Neuromuscular Reeducation Minutes  10  -CP      90285 - PT Manual Therapy Minutes  6  -CP         Exercise 1    Exercise Name 1  LE bike Level 2  -CP      Time 1  5 minutes  -CP         Exercise 2    Exercise Name 2  TG squats  -CP      Cueing 2  Verbal  -CP      Time 2  1 minute  -CP         Exercise 3    Exercise Name 3  standing mod squats for functional DF  -CP      Reps 3  10x  -CP         Exercise 4    Exercise Name 4  Step ups with airex on top of riser (2 risers)  -CP      Reps 4  10x each, intermittent UE support as needed  -CP         Exercise 5    Exercise Name 5  stairs (stairway)  -CP      Reps 5  13  -CP      Additional Comments  no UE support, improved descend  -CP         Exercise 6    Exercise Name 6  standing heel raises, standing toe raises  -CP      Reps 6  15  -CP         Exercise 7    Exercise Name 7  standing wobble board DF/PF, inv/ryan  -CP      Reps 7  15x each  -CP         Exercise 8    Exercise Name 8  gastroc, soleus stretch  -CP      Reps 8  3x each  -CP         Exercise 9    Exercise Name 9  forward lunge onto airex with rebounder for balance challenge (ball into rebounder)  -CP      Sets 9  2  -CP      Reps 9  10x each leg  -CP      Additional Comments  second set with BOSU lunge vs airex  -CP        User Key  (r) = Recorded By, (t) = Taken By, (c) = Cosigned By    Initials Name Provider Type    CP Perkins, Corinne E, PT Physical Therapist                      Manual Rx (last 36 hours)      Manual Treatments     Row Name 08/01/19 1300              Total Minutes    06757 - PT Manual Therapy Minutes  6  -CP         Manual Rx 1    Manual Rx 1 Location  left ankle  -CP      Manual Rx 1 Type  standing DF mobs with mob belt for improved DF AROM; 2 x 10 each with longer lunge position for last set  -CP      Manual Rx 1 Duration  6  -CP        User Key  (r) = Recorded By, (t) = Taken By, (c) = Cosigned By    Initials Name Provider Type    CP Perkins, Corinne E, PT Physical Therapist                             Time Calculation:   Start Time: 1300  Total Timed Code Minutes- PT: 39 minute(s)  Therapy Charges for Today     Code Description Service Date Service Provider Modifiers Qty    00614723835  PT NEUROMUSC RE EDUCATION EA 15 MIN 8/1/2019 Perkins, Corinne E, PT GP 1    33600767205 HC PT THER PROC EA 15 MIN 8/1/2019 Perkins, Corinne E, PT GP 2                    Corinne E. Perkins, PT  8/1/2019

## 2019-08-06 ENCOUNTER — HOSPITAL ENCOUNTER (OUTPATIENT)
Dept: PHYSICAL THERAPY | Facility: HOSPITAL | Age: 64
Setting detail: THERAPIES SERIES
Discharge: HOME | End: 2019-08-06

## 2019-08-06 ENCOUNTER — HOSPITAL ENCOUNTER (OUTPATIENT)
Dept: MRI IMAGING | Facility: HOSPITAL | Age: 64
Discharge: HOME | End: 2019-08-06
Admitting: ORTHOPAEDIC SURGERY

## 2019-08-06 DIAGNOSIS — Z87.81 S/P ORIF (OPEN REDUCTION INTERNAL FIXATION) FRACTURE: Primary | ICD-10-CM

## 2019-08-06 DIAGNOSIS — M72.2 PLANTAR FASCIITIS: ICD-10-CM

## 2019-08-06 DIAGNOSIS — Z98.890 S/P ORIF (OPEN REDUCTION INTERNAL FIXATION) FRACTURE: Primary | ICD-10-CM

## 2019-08-06 PROCEDURE — 73721 MRI JNT OF LWR EXTRE W/O DYE: CPT

## 2019-08-06 PROCEDURE — 97110 THERAPEUTIC EXERCISES: CPT | Performed by: PHYSICAL THERAPIST

## 2019-08-06 NOTE — THERAPY PROGRESS REPORT/RE-CERT
Outpatient Physical Therapy Ortho Progress Note   Jose     Patient Name: Merry Tucker  : 1955  MRN: [id number]  Today's Date: 2019      Visit Date: 2019    Visit Dx:    ICD-10-CM ICD-9-CM   1. S/P ORIF (open reduction internal fixation) fracture Z96.7 V45.89    Z87.81 V15.51       Patient Active Problem List   Diagnosis   • Closed trimalleolar fracture of ankle with high fibular fracture   • Painful orthopaedic hardware (CMS/HCC)   • Venous stasis   • Plantar fasciitis   • Status post open reduction with internal fixation (ORIF) of fracture of ankle        Past Medical History:   Diagnosis Date   • Anxiety    • Arthritis    • Cancer (CMS/HCC)     squamous skin cancer removed    • Cataracts, bilateral    • Diverticulitis    • High cholesterol    • Hypertension    • Hypothyroid    • IBS (irritable bowel syndrome)    • PONV (postoperative nausea and vomiting)    • Stress incontinence    • Wears glasses         Past Surgical History:   Procedure Laterality Date   • ANKLE OPEN REDUCTION INTERNAL FIXATION Right     orif   • ANKLE OPEN REDUCTION INTERNAL FIXATION Left 3/20/2019    Procedure: ANKLE OPEN REDUCTION INTERNAL FIXATION LEFT;  Surgeon: Ole Dominguez MD;  Location:  [facility];  Service: Orthopedics   • AUGMENTATION MAMMAPLASTY Bilateral     silicone    • COLONOSCOPY         • COSMETIC SURGERY      tummy tuck and facelift   • HARDWARE REMOVAL Left 2019    Procedure: ANKLE/FOOT HARDWARE REMOVAL;  Surgeon: Ole Dominguez MD;  Location:  [facility];  Service: Orthopedics       PT Ortho     Row Name 19 1100       Subjective Pain    Pre-Treatment Pain Level  2  -CP    Post-Treatment Pain Level  2  -CP       Posture/Observations    Posture/Observations Comments  mild swelling noted mid foot today; pt reports inconsistent swelling comes and goes, not activity dep.   -CP       Right Lower Ext    Rt Ankle Dorsiflexion AROM  16  -CP    Rt Ankle Dorsiflexion PROM   18  -CP    Rt Ankle Plantarflexion AROM  52  -CP    Rt Ankle Plantarflexion PROM  60  -CP    Rt Ankle Inversion AROM  30  -CP    Rt Ankle Inversion PROM  WFL  -CP    Rt Ankle Eversion AROM  30  -CP    Rt Ankle Eversion PROM  WFL  -CP       Left Lower Ext    Lt Ankle Dorsiflexion AROM  14  -CP    Lt Ankle Dorsiflexion PROM  20  -CP    Lt Ankle Plantarflexion AROM  50  -CP    Lt Ankle Plantarflexion PROM  60  -CP    Lt Ankle Inversion AROM  30  -CP    Lt Ankle Eversion AROM  24  -CP       MMT Left Lower Ext    Lt Knee Extension MMT, Gross Movement  (4+/5) good plus  -CP    Lt Knee Flexion MMT, Gross Movement  (4+/5) good plus  -CP    Lt Ankle Plantarflexion MMT, Gross Movement  (4/5) good  -CP    Lt Ankle Dorsiflexion MMT, Gross Movement  (4/5) good  -CP    Lt Ankle Subtalar Inversion MMT, Gross Movement  (4/5) good  -CP    Lt Ankle Subtalar Eversion MMT, Gross Movement  (4/5) good  -CP       Sensation    Sensation WNL?  WFL  -CP    Light Touch  No apparent deficits  -CP       Balance Skills Training    SLS  LLE 30 sec, RLE 30 sec  -CP    Rhomberg  BLE WFL  -CP    Balance Comments  narrow MARSHA WFL  -CP       Gait/Stairs Assessment/Training    Handrail Location (Stairs)  right side (ascending)  -CP    Number of Steps (Stairs)  13  -CP    Ascending Technique (Stairs)  step-over-step  -CP    Descending Technique (Stairs)  step-over-step  -CP    Comment (Gait/Stairs)  Pt verbalized tightness along left anterior ankle joint with descending but improvemnt overall. Denies increase ankle pain.   -CP      User Key  (r) = Recorded By, (t) = Taken By, (c) = Cosigned By    Initials Name Provider Type    CP Perkins, Corinne E, PT Physical Therapist                      PT Assessment/Plan     Row Name 08/06/19 1100          PT Assessment    Functional Limitations  Impaired gait;Limitations in community activities;Performance in leisure activities  -CP     Impairments  Balance;Endurance;Range of motion;Pain;Muscle strength;Joint  mobility  -CP     Assessment Comments  Pt demonstrated improved left ankle AROM and is progressing towards improved left ankle functional strength and balance as expected. She continues to have pain in bilateral ankles with prolonged walking on hard surface along with tightness of L ankle DF functionally with descending stairs. Pt is indep with current HEP.   -CP     Please refer to paper survey for additional self-reported information  Yes  -CP     Rehab Potential  Good  -CP     Patient/caregiver participated in establishment of treatment plan and goals  Yes  -CP     Patient would benefit from skilled therapy intervention  Yes  -CP        PT Plan    PT Frequency  1x/week  -CP     Predicted Duration of Therapy Intervention (Therapy Eval)  4-6 visits  -CP     Planned CPT's?  PT RE-EVAL: 19516;PT THER PROC EA 15 MIN: 76777;PT THER ACT EA 15 MIN: 31213;PT MANUAL THERAPY EA 15 MIN: 35372;PT NEUROMUSC RE-EDUCATION EA 15 MIN: 46699;PT GAIT TRAINING EA 15 MIN: 44256;PT ELECTRICAL STIM UNATTEND: ;PT HOT OR COLD PACK TREAT MCARE;PT ULTRASOUND EA 15 MIN: 02256;PT IONTOPHORESIS EA 15 MIN: 55269  -CP     PT Plan Comments  Decreased PT freq to 1x/week to conserve PT visits and increase emphasis on HEP. F/u with patient after right ankle MRI and f/u with Dr. Guevara. Could progress left ankle DF mobs next visit along with education of squat technique.   -CP       User Key  (r) = Recorded By, (t) = Taken By, (c) = Cosigned By    Initials Name Provider Type    CP Perkins, Corinne E, PT Physical Therapist            Exercises     Row Name 08/06/19 1100             Subjective Comments    Subjective Comments  Patient states her ankle is doing well overall. She reports she continues to have bilateral ankle pain after prolonged walking (especially on hard surface in stores, grocery shopping). She has f/u with Dr. Guevara for right ankle pain next week and MRI scheduled today. F/u for left ankle is not scheduled until Oct.   -CP          Subjective Pain    Able to rate subjective pain?  yes  -CP      Pre-Treatment Pain Level  2  -CP      Post-Treatment Pain Level  2  -CP         Total Minutes    17433 - PT Therapeutic Exercise Minutes  38  -CP         Exercise 1    Exercise Name 1  Reassessment completed this date in clinic.   -CP         Exercise 2    Exercise Name 2  Reviewed current HEP, educated on progression for self DF mob and compression stocking wear time.   -CP        User Key  (r) = Recorded By, (t) = Taken By, (c) = Cosigned By    Initials Name Provider Type    CP Perkins, Corinne E, PT Physical Therapist                       PT OP Goals     Row Name 08/06/19 1100          PT Short Term Goals    STG Date to Achieve  08/09/19  -CP     STG 1  Patient reports foot/ankle pain is reduced by at least 25%.  -CP     STG 1 Progress  Met  -CP     STG 2  Patient reports of ability to walk up/down steps with reciprocal pattern, pain less than 3/10, one handrail for support.   -CP     STG 2 Progress  Met  -CP     STG 3  Patient is independent with HEP for flexibility and strengthening.  -CP     STG 3 Progress  Met  -CP        Long Term Goals    LTG Date to Achieve  09/05/19  -CP     LTG 1  LEFS score is improved by at least 10 points to demonstrated improved functional mobility.  -CP     LTG 1 Progress  Met  -CP     LTG 2  Patient has ankle strength of 5/5 in all planes to provide improved stability.  -CP     LTG 2 Progress  Ongoing  -CP     LTG 3  Patient will demonstrate independent HEP progressed for closed chain strength, proprioception, balance and agility with minimal or no compensations or exacerbations.  -CP     LTG 3 Progress  Ongoing;Progressing  -CP     LTG 4  Patient will be able to single leg stand on L leg for 10 secs.  -CP     LTG 4 Progress  Met  -CP        Time Calculation    PT Goal Re-Cert Due Date  10/08/19  -CP       User Key  (r) = Recorded By, (t) = Taken By, (c) = Cosigned By    Initials Name Provider Type    CP  Perkins, Corinne E, PT Physical Therapist               Outcome Measure Options: Lower Extremity Functional Scale (LEFS)  Lower Extremity Functional Index  Any of your usual work, housework or school activities: A little bit of difficulty  Your usual hobbies, recreational or sporting activities: A little bit of difficulty  Getting into or out of the bath: No difficulty  Walking between rooms: No difficulty  Putting on your shoes or socks: No difficulty  Squatting: Quite a bit of difficulty  Lifting an object, like a bag of groceries from the floor: No difficulty  Performing light activities around your home: No difficulty  Performing heavy activities around your home: No difficulty  Getting into or out of a car: No difficulty  Walking 2 blocks: No difficulty  Walking a mile: A little bit of difficulty  Going up or down 10 stairs (about 1 flight of stairs): A little bit of difficulty  Standing for 1 hour: A little bit of difficulty  Sitting for 1 hour: No difficulty  Running on even ground: Quite a bit of difficulty  Running on uneven ground: Quite a bit of difficulty  Making sharp turns while running fast: Quite a bit of difficulty  Hopping: Quite a bit of difficulty  Rolling over in bed: No difficulty  Total: 60      Time Calculation:   Start Time: 1115  Total Timed Code Minutes- PT: 38 minute(s)  Therapy Charges for Today     Code Description Service Date Service Provider Modifiers Qty    56073944115 HC PT THER PROC EA 15 MIN 8/6/2019 Perkins, Corinne E, PT GP 3          PT G-Codes  Outcome Measure Options: Lower Extremity Functional Scale (LEFS)  Total: 60         Corinne E. Perkins, ALAYNA  8/6/2019

## 2019-08-14 ENCOUNTER — OFFICE VISIT (OUTPATIENT)
Dept: ORTHOPEDIC SURGERY | Facility: CLINIC | Age: 64
End: 2019-08-14

## 2019-08-14 VITALS — HEART RATE: 64 BPM | HEIGHT: 64 IN | WEIGHT: 141.09 LBS | BODY MASS INDEX: 24.09 KG/M2 | OXYGEN SATURATION: 99 %

## 2019-08-14 DIAGNOSIS — M72.2 PLANTAR FASCIITIS: Primary | ICD-10-CM

## 2019-08-14 PROCEDURE — 99212 OFFICE O/P EST SF 10 MIN: CPT | Performed by: ORTHOPAEDIC SURGERY

## 2019-08-14 NOTE — PROGRESS NOTES
ESTABLISHED PATIENT    Patient: Merry Tucker  : 1955    Primary Care Provider: Bayron Ware MD    Requesting Provider: As above    Follow-up (Post MRI follow up 19 - Plantar fasciitis)      History    Chief Complaint: Right heel pain    History of Present Illness: She returns for follow-up of the right ankle MRI, done 2019.  I looked at the films and the report.  There is no mass in the tarsal tunnel, the MRI shows some arthritis in the ankle as expected following the fracture, and it shows some plantar fasciitis.  She reports she has been doing the stretches, but she was unable to wear the night splint.  She reports it does cause more pain.  I encouraged her to at least look on the Internet to see if there might be some other night splint she might find comfortable.    Current Outpatient Medications on File Prior to Visit   Medication Sig Dispense Refill   • CALCIUM CITRATE-VITAMIN D PO Take 1,000 mg by mouth.     • Cholecalciferol (VITAMIN D PO) Take  by mouth.     • FLUoxetine (PROzac) 40 MG capsule      • levothyroxine (SYNTHROID, LEVOTHROID) 50 MCG tablet TAKE 1 TABLET BY MOUTH IN THE MORNING on an empty stomach. do not eat for 30 minutes  1   • lisinopril (PRINIVIL,ZESTRIL) 5 MG tablet TAKE 1 TABLET BY MOUTH IN THE MORNING FOR BLOOD PRESSURE  1   • meloxicam (MOBIC) 15 MG tablet Take 15 mg by mouth Daily.     • pravastatin (PRAVACHOL) 20 MG tablet Take 20 mg by mouth every night at bedtime.  1   • vitamin D (ERGOCALCIFEROL) 24797 units capsule capsule Take 50,000 Units by mouth 1 (One) Time Per Week.  0   • zolpidem (AMBIEN) 10 MG tablet Take 5 mg by mouth every night at bedtime. Patient states she takes 1/3 of 10 mg tab  2     No current facility-administered medications on file prior to visit.       Allergies   Allergen Reactions   • Penicillins Rash     Has pedro luis. Keflex in the past      Past Medical History:   Diagnosis Date   • Anxiety    • Arthritis    • Cancer (CMS/HCC)      squamous skin cancer removed    • Cataracts, bilateral    • Diverticulitis    • High cholesterol    • Hypertension    • Hypothyroid    • IBS (irritable bowel syndrome)    • PONV (postoperative nausea and vomiting)    • Stress incontinence    • Wears glasses      Past Surgical History:   Procedure Laterality Date   • ANKLE OPEN REDUCTION INTERNAL FIXATION Right 2003    orif   • ANKLE OPEN REDUCTION INTERNAL FIXATION Left 3/20/2019    Procedure: ANKLE OPEN REDUCTION INTERNAL FIXATION LEFT;  Surgeon: Ole Dominguez MD;  Location:  [facility];  Service: Orthopedics   • AUGMENTATION MAMMAPLASTY Bilateral 1987    silicone    • COLONOSCOPY      2017   • COSMETIC SURGERY      tummy tuck and facelift   • HARDWARE REMOVAL Left 6/24/2019    Procedure: ANKLE/FOOT HARDWARE REMOVAL;  Surgeon: Ole Dominguez MD;  Location:  [facility];  Service: Orthopedics     Family History   Problem Relation Age of Onset   • Cancer Mother    • Hypertension Mother    • Cancer Father    • Hypertension Father    • Heart attack Father    • Breast cancer Neg Hx    • Ovarian cancer Neg Hx       Social History     Socioeconomic History   • Marital status:      Spouse name: Not on file   • Number of children: Not on file   • Years of education: Not on file   • Highest education level: Not on file   Tobacco Use   • Smoking status: Never Smoker   • Smokeless tobacco: Never Used   Substance and Sexual Activity   • Alcohol use: Yes     Comment: social   • Drug use: No   • Sexual activity: Defer        Review of Systems   Constitutional: Negative.    HENT: Negative.    Eyes: Negative.    Respiratory: Negative.    Cardiovascular: Negative.    Gastrointestinal: Negative.    Endocrine: Negative.    Genitourinary: Negative.    Musculoskeletal: Positive for arthralgias.   Skin: Negative.    Allergic/Immunologic: Negative.    Neurological: Negative.    Hematological: Negative.    Psychiatric/Behavioral: Negative.        The following portions of the  "patient's history were reviewed and updated as appropriate: allergies, current medications, past family history, past medical history, past social history, past surgical history and problem list.    Physical Exam:   Pulse 64   Ht 162.6 cm (64.02\")   Wt 64 kg (141 lb 1.5 oz)   SpO2 99%   Breastfeeding? No   BMI 24.21 kg/m²   GENERAL: Gait: antalgic       MSK:  Ankle:  Right: Tender at the origin of the plantar fascia;  Medical Decision Making    Data Review:   reviewed radiology images and reviewed radiology results    Assessment/Plan/Diagnosis/Treatment Options:   1. Plantar fasciitis  I went over the MRI with her.  I explained there is no surgery I have that will help this.  For plantar fasciitis what works is the stretching and the night splint.  We also discussed casting.  She would like to try aggressive stretching, I went over the stretches again with her.  She will try to find a night splint that is comfortable.  I will see her in 4 months if she is not improved and we will talk about casting                            "

## 2019-08-15 ENCOUNTER — HOSPITAL ENCOUNTER (OUTPATIENT)
Dept: PHYSICAL THERAPY | Facility: HOSPITAL | Age: 64
Setting detail: THERAPIES SERIES
Discharge: HOME | End: 2019-08-15

## 2019-08-15 DIAGNOSIS — Z98.890 S/P ORIF (OPEN REDUCTION INTERNAL FIXATION) FRACTURE: Primary | ICD-10-CM

## 2019-08-15 DIAGNOSIS — Z87.81 S/P ORIF (OPEN REDUCTION INTERNAL FIXATION) FRACTURE: Primary | ICD-10-CM

## 2019-08-15 PROCEDURE — 97110 THERAPEUTIC EXERCISES: CPT | Performed by: PHYSICAL THERAPIST

## 2019-08-15 PROCEDURE — 97140 MANUAL THERAPY 1/> REGIONS: CPT | Performed by: PHYSICAL THERAPIST

## 2019-08-15 NOTE — THERAPY TREATMENT NOTE
Outpatient Physical Therapy Ortho Treatment Note   Jose     Patient Name: Merry Tucker  : 1955  MRN: [id number]  Today's Date: 8/15/2019      Visit Date: 08/15/2019    Visit Dx:    ICD-10-CM ICD-9-CM   1. S/P ORIF (open reduction internal fixation) fracture Z96.7 V45.89    Z87.81 V15.51       Patient Active Problem List   Diagnosis   • Closed trimalleolar fracture of ankle with high fibular fracture   • Painful orthopaedic hardware (CMS/HCC)   • Venous stasis   • Plantar fasciitis   • Status post open reduction with internal fixation (ORIF) of fracture of ankle        Past Medical History:   Diagnosis Date   • Anxiety    • Arthritis    • Cancer (CMS/HCC)     squamous skin cancer removed    • Cataracts, bilateral    • Diverticulitis    • High cholesterol    • Hypertension    • Hypothyroid    • IBS (irritable bowel syndrome)    • PONV (postoperative nausea and vomiting)    • Stress incontinence    • Wears glasses         Past Surgical History:   Procedure Laterality Date   • ANKLE OPEN REDUCTION INTERNAL FIXATION Right     orif   • ANKLE OPEN REDUCTION INTERNAL FIXATION Left 3/20/2019    Procedure: ANKLE OPEN REDUCTION INTERNAL FIXATION LEFT;  Surgeon: Ole Dominguez MD;  Location:  [facility];  Service: Orthopedics   • AUGMENTATION MAMMAPLASTY Bilateral     silicone    • COLONOSCOPY         • COSMETIC SURGERY      tummy tuck and facelift   • HARDWARE REMOVAL Left 2019    Procedure: ANKLE/FOOT HARDWARE REMOVAL;  Surgeon: Ole Dominguez MD;  Location:  [facility];  Service: Orthopedics                       PT Assessment/Plan     Row Name 08/15/19 1300          PT Assessment    Assessment Comments  Improved left functional ankle DF persists after ankle mobs; however no change in pain mgmt. Pain isolated to anterior ankle joint end range DF. Held progression of NMRE and ther ex in clinic secondary to reports of increased pain and fatigue on arrival.   -CP        PT Plan    PT  Plan Comments  Will f/u 1x/week. Could consider re-eval for right foot pain, pending PT order, if symptoms persists. Give written HEP next visit for improved compliance with stretching. Pt educated on night splint options for home and progression of POC.   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 Reports overall both feet are stiff and noted increase pain with walking.   -CP         Subjective Pain    Able to rate subjective pain?  yes  -CP      Pre-Treatment Pain Level  2  -CP      Post-Treatment Pain Level  2  -CP         Total Minutes    26242 - PT Therapeutic Exercise Minutes  32  -CP      99586 - PT Manual Therapy Minutes  6  -CP         Exercise 1    Exercise Name 1  LE bike Level 2  -CP      Time 1  5 minutes  -CP         Exercise 2    Exercise Name 2  TG squats  -CP      Time 2  2 minutes  -CP         Exercise 5    Exercise Name 5  standing gastroc stretch on 1/2 foam  -CP      Reps 5  3x  -CP      Time 5  10 sec each  -CP         Exercise 6    Exercise Name 6  standing heel raises, standing toe raises  -CP      Reps 6  15  -CP         Exercise 7    Exercise Name 7  standing wobble board DF/PF, inv/ryan  -CP      Reps 7  15x each  -CP         Exercise 8    Exercise Name 8  gastroc, soleus stretch  -CP      Reps 8  3x each  -CP      Additional Comments  long sitting position  -CP         Exercise 9    Exercise Name 9  forward lunge onto airex with rebounder for balance challenge (ball into rebounder)  -CP      Reps 9  held today d/t increased pain in both feet.   -CP        User Key  (r) = Recorded By, (t) = Taken By, (c) = Cosigned By    Initials Name Provider Type    CP Perkins, Corinne E, PT Physical Therapist                       Manual Rx (last 36 hours)      Manual Treatments     Row Name 08/15/19 1300             Total Minutes    03296 - PT Manual Therapy Minutes  6  -CP         Manual Rx 1    Manual Rx 1 Location  left ankle  -CP      Manual Rx 1 Type  standing DF mobs with mob belt for improved DF AROM; 2 x 10 each with longer lunge position for last set  -CP      Manual Rx 1 Duration  6  -CP        User Key  (r) = Recorded By, (t) = Taken By, (c) = Cosigned By    Initials Name Provider Type    CP Perkins, Corinne E, PT Physical Therapist                             Time Calculation:   Start Time: 1304  Total Timed Code Minutes- PT: 38 minute(s)  Therapy Charges for Today     Code Description Service Date Service Provider Modifiers Qty    61581964424 HC PT THER PROC EA 15 MIN 8/15/2019 Perkins, Corinne E, PT GP 2    [id number] HC PT MANUAL THERAPY EA 15 MIN 8/15/2019 Perkins, Corinne E, PT GP 1                    Corinne E. Perkins, PT  8/15/2019

## 2019-08-19 ENCOUNTER — APPOINTMENT (OUTPATIENT)
Dept: PHYSICAL THERAPY | Facility: HOSPITAL | Age: 64
End: 2019-08-19

## 2019-08-19 ENCOUNTER — HOSPITAL ENCOUNTER (OUTPATIENT)
Dept: PHYSICAL THERAPY | Facility: HOSPITAL | Age: 64
Setting detail: THERAPIES SERIES
Discharge: HOME | End: 2019-08-19

## 2019-08-19 DIAGNOSIS — Z98.890 S/P ORIF (OPEN REDUCTION INTERNAL FIXATION) FRACTURE: Primary | ICD-10-CM

## 2019-08-19 DIAGNOSIS — Z87.81 S/P ORIF (OPEN REDUCTION INTERNAL FIXATION) FRACTURE: Primary | ICD-10-CM

## 2019-08-19 PROCEDURE — 97110 THERAPEUTIC EXERCISES: CPT | Performed by: PHYSICAL THERAPIST

## 2019-08-19 PROCEDURE — 97140 MANUAL THERAPY 1/> REGIONS: CPT | Performed by: PHYSICAL THERAPIST

## 2019-08-19 NOTE — THERAPY TREATMENT NOTE
Outpatient Physical Therapy Ortho Treatment Note   Jose     Patient Name: Merry Tucker  : 1955  MRN: [id number]  Today's Date: 2019      Visit Date: 2019    Visit Dx:    ICD-10-CM ICD-9-CM   1. S/P ORIF (open reduction internal fixation) fracture Z96.7 V45.89    Z87.81 V15.51       Patient Active Problem List   Diagnosis   • Closed trimalleolar fracture of ankle with high fibular fracture   • Painful orthopaedic hardware (CMS/HCC)   • Venous stasis   • Plantar fasciitis   • Status post open reduction with internal fixation (ORIF) of fracture of ankle        Past Medical History:   Diagnosis Date   • Anxiety    • Arthritis    • Cancer (CMS/HCC)     squamous skin cancer removed    • Cataracts, bilateral    • Diverticulitis    • High cholesterol    • Hypertension    • Hypothyroid    • IBS (irritable bowel syndrome)    • PONV (postoperative nausea and vomiting)    • Stress incontinence    • Wears glasses         Past Surgical History:   Procedure Laterality Date   • ANKLE OPEN REDUCTION INTERNAL FIXATION Right     orif   • ANKLE OPEN REDUCTION INTERNAL FIXATION Left 3/20/2019    Procedure: ANKLE OPEN REDUCTION INTERNAL FIXATION LEFT;  Surgeon: Ole Dominguez MD;  Location:  [facility];  Service: Orthopedics   • AUGMENTATION MAMMAPLASTY Bilateral     silicone    • COLONOSCOPY         • COSMETIC SURGERY      tummy tuck and facelift   • HARDWARE REMOVAL Left 2019    Procedure: ANKLE/FOOT HARDWARE REMOVAL;  Surgeon: Ole Dominguez MD;  Location:  [facility];  Service: Orthopedics                       PT Assessment/Plan     Row Name 19 1015          PT Assessment    Assessment Comments  Pt educated on stretches for right ankle pain and educated on ionto options. Pt reports she did order a new night splint for foot. She denies new left ankle symptoms, verbalized compliance with current HEP.   -CP        PT Plan    PT Plan Comments  Progress in clinic with NMRE and  "ther exercises as tolerated. Could reassess DF AROM next visit.   -CP       User Key  (r) = Recorded By, (t) = Taken By, (c) = Cosigned By    Initials Name Provider Type    CP Perkins, Corinne E, PT Physical Therapist            Exercises     Row Name 08/19/19 1000             Subjective Comments    Subjective Comments  Pt reports her overall left ankle pain is about the same, states she hasn't been as active so far today so reports a tad lower pain. She states the \"band of tightness along front of left ankle joint\" is the primary complaint.   -CP         Subjective Pain    Able to rate subjective pain?  yes  -CP      Pre-Treatment Pain Level  1  -CP      Post-Treatment Pain Level  1  -CP         Total Minutes    99519 - PT Therapeutic Exercise Minutes  26  -CP      47460 - PT Manual Therapy Minutes  12  -CP         Exercise 1    Exercise Name 1  LE bike Level 2  -CP      Time 1  5 minutes  -CP         Exercise 2    Exercise Name 2  TG squats  -CP      Cueing 2  Verbal  -CP      Time 2  held today  -CP         Exercise 3    Exercise Name 3  NMRE on trampoline: narrow MARSHA EO/EC on trampoline, mod tandem stance EO, tandem stance EO, and SLS pause holds  -CP      Cueing 3  Verbal;Demo  -CP      Reps 3  2x  -CP      Time 3  20-30 sec each  -CP      Additional Comments  20 sec holds for SLS; 30 sec holds everything else  -CP         Exercise 5    Exercise Name 5  standing gastroc stretch on 1/2 foam  -CP      Reps 5  3x  -CP      Time 5  10 sec each  -CP         Exercise 6    Exercise Name 6  standing heel raises, standing toe raises  -CP      Reps 6  15  -CP         Exercise 8    Exercise Name 8  gastroc, soleus stretch  -CP      Reps 8  3x each  -CP         Exercise 9    Exercise Name 9  forward lunge onto airex with rebounder for balance challenge (ball into rebounder)  -CP      Reps 9  10x each  -CP        User Key  (r) = Recorded By, (t) = Taken By, (c) = Cosigned By    Initials Name Provider Type    CP Renato" Corinne E, PT Physical Therapist                      Manual Rx (last 36 hours)      Manual Treatments     Row Name 08/19/19 1000             Total Minutes    97020 - PT Manual Therapy Minutes  12  -CP         Manual Rx 1    Manual Rx 1 Location  left ankle  -CP      Manual Rx 1 Type  standing DF mobs with mob belt for improved DF AROM; DF mobs from supine position followed by forefoot rotation.   -CP      Manual Rx 1 Duration  12  -CP        User Key  (r) = Recorded By, (t) = Taken By, (c) = Cosigned By    Initials Name Provider Type    CP Perkins, Corinne E, PT Physical Therapist                             Time Calculation:   Start Time: 1020  Total Timed Code Minutes- PT: 38 minute(s)  Therapy Charges for Today     Code Description Service Date Service Provider Modifiers Qty    04387285256  PT THER PROC EA 15 MIN 8/19/2019 Perkins, Corinne E, PT GP 2    [id number]  PT MANUAL THERAPY EA 15 MIN 8/19/2019 Perkins, Corinne E, PT GP 1                    Corinne E. Perkins, PT  8/19/2019

## 2019-08-27 ENCOUNTER — HOSPITAL ENCOUNTER (OUTPATIENT)
Dept: PHYSICAL THERAPY | Facility: HOSPITAL | Age: 64
Setting detail: THERAPIES SERIES
Discharge: HOME | End: 2019-08-27

## 2019-08-27 DIAGNOSIS — Z87.81 S/P ORIF (OPEN REDUCTION INTERNAL FIXATION) FRACTURE: Primary | ICD-10-CM

## 2019-08-27 DIAGNOSIS — Z98.890 S/P ORIF (OPEN REDUCTION INTERNAL FIXATION) FRACTURE: Primary | ICD-10-CM

## 2019-08-27 PROCEDURE — 97110 THERAPEUTIC EXERCISES: CPT | Performed by: PHYSICAL THERAPIST

## 2019-08-27 PROCEDURE — 97140 MANUAL THERAPY 1/> REGIONS: CPT | Performed by: PHYSICAL THERAPIST

## 2019-08-27 NOTE — THERAPY PROGRESS REPORT/RE-CERT
Outpatient Physical Therapy Ortho Progress Note   Jose     Patient Name: Merry Tucker  : 1955  MRN: [id number]  Today's Date: 2019      Visit Date: 2019    Visit Dx:    ICD-10-CM ICD-9-CM   1. S/P ORIF (open reduction internal fixation) fracture Z96.7 V45.89    Z87.81 V15.51       Patient Active Problem List   Diagnosis   • Closed trimalleolar fracture of ankle with high fibular fracture   • Painful orthopaedic hardware (CMS/HCC)   • Venous stasis   • Plantar fasciitis   • Status post open reduction with internal fixation (ORIF) of fracture of ankle        Past Medical History:   Diagnosis Date   • Anxiety    • Arthritis    • Cancer (CMS/HCC)     squamous skin cancer removed    • Cataracts, bilateral    • Diverticulitis    • High cholesterol    • Hypertension    • Hypothyroid    • IBS (irritable bowel syndrome)    • PONV (postoperative nausea and vomiting)    • Stress incontinence    • Wears glasses         Past Surgical History:   Procedure Laterality Date   • ANKLE OPEN REDUCTION INTERNAL FIXATION Right     orif   • ANKLE OPEN REDUCTION INTERNAL FIXATION Left 3/20/2019    Procedure: ANKLE OPEN REDUCTION INTERNAL FIXATION LEFT;  Surgeon: Ole Dominguez MD;  Location:  [facility];  Service: Orthopedics   • AUGMENTATION MAMMAPLASTY Bilateral     silicone    • COLONOSCOPY         • COSMETIC SURGERY      tummy tuck and facelift   • HARDWARE REMOVAL Left 2019    Procedure: ANKLE/FOOT HARDWARE REMOVAL;  Surgeon: Ole Dominguez MD;  Location:  [facility];  Service: Orthopedics       PT Ortho     Row Name 19 1300       Subjective Comments    Subjective Comments  Pt staets pain continues to come and go in left ankle with swelling. She reports indep with current HEP. She states increased pain and swelling noted over the weekend but improved on arrival.   -CP       Subjective Pain    Post-Treatment Pain Level  2  -CP       Left Lower Ext    Lt Ankle Dorsiflexion  "AROM  17  -CP    Lt Ankle Dorsiflexion PROM  20  -CP    Lt Ankle Plantarflexion AROM  52  -CP    Lt Ankle Plantarflexion PROM  60  -CP    Lt Ankle Inversion AROM  WFL  -CP    Lt Ankle Eversion AROM  WFL  -CP       MMT Left Lower Ext    Lt Knee Extension MMT, Gross Movement  (4+/5) good plus  -CP    Lt Knee Flexion MMT, Gross Movement  (4+/5) good plus  -CP    Lt Ankle Plantarflexion MMT, Gross Movement  (4+/5) good plus  -CP    Lt Ankle Dorsiflexion MMT, Gross Movement  (4/5) good  -CP    Lt Ankle Subtalar Inversion MMT, Gross Movement  (4+/5) good plus  -CP    Lt Ankle Subtalar Eversion MMT, Gross Movement  (4+/5) good plus  -CP       Sensation    Sensation WNL?  WFL  -CP      User Key  (r) = Recorded By, (t) = Taken By, (c) = Cosigned By    Initials Name Provider Type    CP Perkins, Corinne E, PT Physical Therapist                      PT Assessment/Plan     Row Name 08/27/19 1300          PT Assessment    Functional Limitations  Impaired gait;Limitations in community activities;Performance in leisure activities  -CP     Impairments  Balance;Endurance;Pain;Muscle strength  -CP     Assessment Comments  Pt demonstrated improved left ankle DF AROM after manual techniques. She continues to have c/o \"band of tightness\" along anterior left ankle. She is compliant with current HEP, states pain overall is about the same.   -CP     Please refer to paper survey for additional self-reported information  Yes  -CP     Rehab Potential  Good  -CP     Patient/caregiver participated in establishment of treatment plan and goals  Yes  -CP     Patient would benefit from skilled therapy intervention  Yes  -CP        PT Plan    PT Frequency  1x/week  -CP     Predicted Duration of Therapy Intervention (Therapy Eval)  1-2 visits as needed  -CP     Planned CPT's?  PT RE-EVAL: 39087;PT THER PROC EA 15 MIN: 42300;PT THER ACT EA 15 MIN: 99301;PT MANUAL THERAPY EA 15 MIN: 69465;PT NEUROMUSC RE-EDUCATION EA 15 MIN: 69707;PT GAIT TRAINING EA 15 " MIN: 23407;PT HOT OR COLD PACK TREAT MCARE;PT ELECTRICAL STIM UNATTEND: ;PT ULTRASOUND EA 15 MIN: 87073  -CP     PT Plan Comments  Pt indep with current HEP, appropriate to decrease PT freq to conserve visits with increased emphasis on HEP. Will hold patient chart for 30 days if f/u required for left ankle.   -CP       User Key  (r) = Recorded By, (t) = Taken By, (c) = Cosigned By    Initials Name Provider Type    CP Perkins, Corinne E, PT Physical Therapist            Exercises     Row Name 08/27/19 1300             Subjective Comments    Subjective Comments  Pt staets pain continues to come and go in left ankle with swelling. She reports indep with current HEP. She states increased pain and swelling noted over the weekend but improved on arrival.   -CP         Subjective Pain    Able to rate subjective pain?  yes  -CP      Pre-Treatment Pain Level  2  -CP      Post-Treatment Pain Level  2  -CP         Total Minutes    80718 - PT Therapeutic Exercise Minutes  23  -CP      00691 - PT Manual Therapy Minutes  15  -CP         Exercise 1    Exercise Name 1  LE bike Level 2  -CP      Time 1  hold  -CP         Exercise 2    Exercise Name 2  Ankle rocker stretch DF/PF  -CP      Reps 2  5x each  -CP      Time 2  10 sec each  -CP         Exercise 3    Exercise Name 3  NMRE on trampoline: narrow MARSHA EO/EC on trampoline, mod tandem stance EO, tandem stance EO, and SLS pause holds  -CP      Reps 3  held  -CP         Exercise 4    Exercise Name 4  OP to ankle DF/PF  -CP      Reps 4  10x  -CP         Exercise 5    Exercise Name 5  standing gastroc stretch on 1/2 foam  -CP      Reps 5  3x  -CP      Time 5  10 sec each  -CP         Exercise 6    Exercise Name 6  standing heel raises, standing toe raises  -CP      Reps 6  15  -CP         Exercise 7    Exercise Name 7  forward lunge   -CP      Reps 7  10x each  -CP         Exercise 8    Exercise Name 8  gastroc, soleus stretch  -CP      Reps 8  3x each  -CP         Exercise 9     Exercise Name 9  Reassessment completed today in clinic, reviewed HEP for home and indep mgmt.   -CP        User Key  (r) = Recorded By, (t) = Taken By, (c) = Cosigned By    Initials Name Provider Type    CP Perkins, Corinne E, PT Physical Therapist                      Manual Rx (last 36 hours)      Manual Treatments     Row Name 08/27/19 1300             Total Minutes    05290 - PT Manual Therapy Minutes  15  -CP         Manual Rx 1    Manual Rx 1 Location  left ankle  -CP      Manual Rx 1 Type  Standing  DF mob with mobilization belt for improved AROM; followed by subtalar mobs, ankle OP with stretch and AP glides.   -CP         Manual Rx 2    Manual Rx 2 Location  left gastroc  -CP      Manual Rx 2 Type  foam rolling along medial and lateral gastroc into Achilles tendon  -CP        User Key  (r) = Recorded By, (t) = Taken By, (c) = Cosigned By    Initials Name Provider Type    CP Perkins, Corinne E, PT Physical Therapist          PT OP Goals     Row Name 08/27/19 1300          PT Short Term Goals    STG Date to Achieve  08/09/19  -CP     STG 1  Patient reports foot/ankle pain is reduced by at least 25%.  -CP     STG 1 Progress  Met  -CP     STG 2  Patient reports of ability to walk up/down steps with reciprocal pattern, pain less than 3/10, one handrail for support.   -CP     STG 2 Progress  Met  -CP     STG 3  Patient is independent with HEP for flexibility and strengthening.  -CP     STG 3 Progress  Met  -CP        Long Term Goals    LTG Date to Achieve  09/26/19  -CP     LTG 1  LEFS score is improved by at least 10 points to demonstrated improved functional mobility.  -CP     LTG 1 Progress  Met  -CP     LTG 2  Patient has ankle strength of 5/5 in all planes to provide improved stability.  -CP     LTG 2 Progress  Ongoing;Progressing  -CP     LTG 3  Patient will demonstrate independent HEP progressed for closed chain strength, proprioception, balance and agility with minimal or no compensations or  exacerbations.  -CP     LTG 3 Progress  Ongoing;Progressing  -CP     LTG 4  Patient will be able to single leg stand on L leg for 10 secs.  -CP     LTG 4 Progress  Met  -CP        Time Calculation    PT Goal Re-Cert Due Date  10/08/19  -CP       User Key  (r) = Recorded By, (t) = Taken By, (c) = Cosigned By    Initials Name Provider Type    CP Perkins, Corinne E, PT Physical Therapist                         Time Calculation:   Start Time: 1348  Total Timed Code Minutes- PT: 38 minute(s)  Therapy Charges for Today     Code Description Service Date Service Provider Modifiers Qty    [id number]  PT THER PROC EA 15 MIN 8/27/2019 Perkins, Corinne E, PT GP 2    [id number] HC PT MANUAL THERAPY EA 15 MIN 8/27/2019 Perkins, Corinne E, PT GP 1                    Corinne E. Perkins, PT  8/27/2019

## 2019-10-08 ENCOUNTER — OFFICE VISIT (OUTPATIENT)
Dept: ORTHOPEDIC SURGERY | Facility: CLINIC | Age: 64
End: 2019-10-08

## 2019-10-08 VITALS — HEART RATE: 70 BPM | BODY MASS INDEX: 23.34 KG/M2 | HEIGHT: 64 IN | OXYGEN SATURATION: 99 % | WEIGHT: 136.69 LBS

## 2019-10-08 DIAGNOSIS — T84.84XA PAINFUL ORTHOPAEDIC HARDWARE (HCC): ICD-10-CM

## 2019-10-08 DIAGNOSIS — Z98.890 S/P ORIF (OPEN REDUCTION INTERNAL FIXATION) FRACTURE: Primary | ICD-10-CM

## 2019-10-08 DIAGNOSIS — Z87.81 S/P ORIF (OPEN REDUCTION INTERNAL FIXATION) FRACTURE: Primary | ICD-10-CM

## 2019-10-08 PROCEDURE — 99212 OFFICE O/P EST SF 10 MIN: CPT | Performed by: ORTHOPAEDIC SURGERY

## 2019-10-08 NOTE — PROGRESS NOTES
Orthopaedic Clinic Note: Established Patient    Chief Complaint   Patient presents with   • Left Ankle - Follow-up     3 month f/u , Left Ankle Open Reduction Internal Fixation 03/20/2019        HPI    It has been 3  month(s) since Ms. Tucker's last visit. She returns to clinic today for follow-up left ankle hardware removal.  She underwent operative fixation ankle fracture in March subsequently had hardware removal in the summer.  She comes to clinic today for follow-up evaluation.  She still having a dull aching sensation in the ankle as well as occasional swelling that is improving.  She is ambulating with no assistive device.  Denies fevers chills or constitutional symptoms.  Overall she is doing better.    Past Medical History:   Diagnosis Date   • Anxiety    • Arthritis    • Cancer (CMS/HCC)     squamous skin cancer removed    • Cataracts, bilateral    • Diverticulitis    • High cholesterol    • Hypertension    • Hypothyroid    • IBS (irritable bowel syndrome)    • PONV (postoperative nausea and vomiting)    • Stress incontinence    • Wears glasses       Past Surgical History:   Procedure Laterality Date   • ANKLE OPEN REDUCTION INTERNAL FIXATION Right 2003    orif   • ANKLE OPEN REDUCTION INTERNAL FIXATION Left 3/20/2019    Procedure: ANKLE OPEN REDUCTION INTERNAL FIXATION LEFT;  Surgeon: Ole Dominguez MD;  Location:  [facility];  Service: Orthopedics   • AUGMENTATION MAMMAPLASTY Bilateral 1987    silicone    • COLONOSCOPY      2017   • COSMETIC SURGERY      tummy tuck and facelift   • HARDWARE REMOVAL Left 6/24/2019    Procedure: ANKLE/FOOT HARDWARE REMOVAL;  Surgeon: Ole Dominguez MD;  Location:  [facility];  Service: Orthopedics      Family History   Problem Relation Age of Onset   • Cancer Mother    • Hypertension Mother    • Cancer Father    • Hypertension Father    • Heart attack Father    • Breast cancer Neg Hx    • Ovarian cancer Neg Hx      Social History     Socioeconomic History   •  Marital status:      Spouse name: Not on file   • Number of children: Not on file   • Years of education: Not on file   • Highest education level: Not on file   Tobacco Use   • Smoking status: Never Smoker   • Smokeless tobacco: Never Used   Substance and Sexual Activity   • Alcohol use: Yes     Comment: social   • Drug use: No   • Sexual activity: Defer      Current Outpatient Medications on File Prior to Visit   Medication Sig Dispense Refill   • CALCIUM CITRATE-VITAMIN D PO Take 1,000 mg by mouth.     • Cholecalciferol (VITAMIN D PO) Take  by mouth.     • FLUoxetine (PROzac) 40 MG capsule      • levothyroxine (SYNTHROID, LEVOTHROID) 50 MCG tablet TAKE 1 TABLET BY MOUTH IN THE MORNING on an empty stomach. do not eat for 30 minutes  1   • lisinopril (PRINIVIL,ZESTRIL) 5 MG tablet TAKE 1 TABLET BY MOUTH IN THE MORNING FOR BLOOD PRESSURE  1   • meloxicam (MOBIC) 15 MG tablet Take 15 mg by mouth Daily.     • pravastatin (PRAVACHOL) 20 MG tablet Take 20 mg by mouth every night at bedtime.  1   • vitamin D (ERGOCALCIFEROL) 19838 units capsule capsule Take 50,000 Units by mouth 1 (One) Time Per Week.  0   • Vortioxetine HBr (TRINTELLIX) 5 MG tablet Take 5 mg by mouth Daily With Breakfast.     • zolpidem (AMBIEN) 10 MG tablet Take 5 mg by mouth every night at bedtime. Patient states she takes 1/3 of 10 mg tab  2     No current facility-administered medications on file prior to visit.       Allergies   Allergen Reactions   • Penicillins Rash     Has pedro luis. Keflex in the past        Review of Systems   Constitutional: Negative.    HENT: Positive for postnasal drip, sinus pressure and sneezing.    Eyes: Negative.    Respiratory: Negative.    Cardiovascular: Positive for leg swelling.   Gastrointestinal: Negative.    Endocrine: Negative.    Genitourinary: Negative.    Musculoskeletal: Positive for arthralgias and joint swelling.   Skin: Negative.    Allergic/Immunologic: Negative.    Neurological: Negative.   "  Hematological: Negative.    Psychiatric/Behavioral: Negative.         The patient's Review of Systems was personally reviewed and confirmed as accurate.    Physical Exam  Pulse 70, height 162.6 cm (64.02\"), weight 62 kg (136 lb 11 oz), SpO2 99 %, not currently breastfeeding.    Body mass index is 23.45 kg/m².    GENERAL APPEARANCE: awake, alert, oriented, in no acute distress and well developed, well nourished  LUNGS:  breathing nonlabored  EXTREMITIES: no clubbing, cyanosis          Left Lower Extremity Exam:    Medial lateral incisions are well-healed with no erythema or drainage.  Patient has ankle dorsiflexion and plantar flexion which is symmetric to contralateral side.  She does have some trace tenderness laterally over the superficial hardware.  Nontender medially.  5/5 plantarflexion and dorsiflexion strength.  Palpable dorsalis pedis and posterior tibial pulses.  SENSATION TO LIGHT TOUCH:  DEEP PERONEAL/SUPERFICIAL PERONEAL/SURAL/SAPHENOUS/TIBIAL:   intact  _______________________________________________________________  _______________________________________________________________    RADIOGRAPHIC FINDINGS:   No new imaging    Assessment/Plan:   Diagnosis Plan   1. S/P ORIF (open reduction internal fixation) fracture     2. Painful orthopaedic hardware (CMS/HCC)       Patient is doing well status post fixation of ankle fracture followed by hardware removal.  I explained to her that her intermittent swelling and stiffness of the ankle is normal can occur up for up to a year after the surgery.  I reassured her that her last set of radiographs demonstrated a clinically and radiographically healed fracture with symmetric ankle mortise and optimal alignment.  She expressed understanding.  She will follow-up as needed.    Ole Dominguez MD  10/08/19  1:47 PM  "

## 2019-10-25 ENCOUNTER — TRANSCRIBE ORDERS (OUTPATIENT)
Dept: ADMINISTRATIVE | Facility: HOSPITAL | Age: 64
End: 2019-10-25

## 2019-10-25 DIAGNOSIS — Z12.31 VISIT FOR SCREENING MAMMOGRAM: Primary | ICD-10-CM

## 2019-12-16 ENCOUNTER — OFFICE VISIT (OUTPATIENT)
Dept: ORTHOPEDIC SURGERY | Facility: CLINIC | Age: 64
End: 2019-12-16

## 2019-12-16 VITALS — OXYGEN SATURATION: 97 % | BODY MASS INDEX: 23.34 KG/M2 | HEART RATE: 91 BPM | WEIGHT: 136.69 LBS | HEIGHT: 64 IN

## 2019-12-16 DIAGNOSIS — M72.2 PLANTAR FASCIITIS: Primary | ICD-10-CM

## 2019-12-16 PROCEDURE — 99212 OFFICE O/P EST SF 10 MIN: CPT | Performed by: ORTHOPAEDIC SURGERY

## 2019-12-16 RX ORDER — LEVOTHYROXINE SODIUM 0.07 MG/1
TABLET ORAL
COMMUNITY
Start: 2019-11-14

## 2019-12-16 RX ORDER — FLUOXETINE HYDROCHLORIDE 20 MG/1
CAPSULE ORAL
COMMUNITY
Start: 2019-11-14

## 2019-12-16 NOTE — PROGRESS NOTES
ESTABLISHED PATIENT    Patient: Merry Tucker  : 1955    Primary Care Provider: Bayron Ware MD    Requesting Provider: As above    Follow-up of the Right Foot (4 months)      History    Chief Complaint: Right heel pain    History of Present Illness: She reports with her plantar fasciitis essentially resolved.  She has been treated by her podiatrist for a stress fracture in the foot but the heel pain is gone.    Current Outpatient Medications on File Prior to Visit   Medication Sig Dispense Refill   • CALCIUM CITRATE-VITAMIN D PO Take 1,000 mg by mouth.     • Cholecalciferol (VITAMIN D PO) Take  by mouth.     • FLUoxetine (PROzac) 20 MG capsule      • FLUoxetine (PROzac) 40 MG capsule      • levothyroxine (SYNTHROID, LEVOTHROID) 75 MCG tablet      • lisinopril (PRINIVIL,ZESTRIL) 5 MG tablet TAKE 1 TABLET BY MOUTH IN THE MORNING FOR BLOOD PRESSURE  1   • meloxicam (MOBIC) 15 MG tablet Take 15 mg by mouth Daily.     • pravastatin (PRAVACHOL) 20 MG tablet Take 20 mg by mouth every night at bedtime.  1   • vitamin D (ERGOCALCIFEROL) 02072 units capsule capsule Take 50,000 Units by mouth 1 (One) Time Per Week.  0   • Vortioxetine HBr (TRINTELLIX) 5 MG tablet Take 5 mg by mouth Daily With Breakfast.     • zolpidem (AMBIEN) 10 MG tablet Take 5 mg by mouth every night at bedtime. Patient states she takes 1/3 of 10 mg tab  2   • [DISCONTINUED] levothyroxine (SYNTHROID, LEVOTHROID) 50 MCG tablet TAKE 1 TABLET BY MOUTH IN THE MORNING on an empty stomach. do not eat for 30 minutes  1     No current facility-administered medications on file prior to visit.       Allergies   Allergen Reactions   • Penicillins Rash     Has pedro luis. Keflex in the past      Past Medical History:   Diagnosis Date   • Anxiety    • Arthritis    • Cancer (CMS/HCC)     squamous skin cancer removed    • Cataracts, bilateral    • Diverticulitis    • High cholesterol    • Hypertension    • Hypothyroid    • IBS (irritable bowel syndrome)    •  PONV (postoperative nausea and vomiting)    • Stress incontinence    • Wears glasses      Past Surgical History:   Procedure Laterality Date   • ANKLE OPEN REDUCTION INTERNAL FIXATION Right 2003    orif   • ANKLE OPEN REDUCTION INTERNAL FIXATION Left 3/20/2019    Procedure: ANKLE OPEN REDUCTION INTERNAL FIXATION LEFT;  Surgeon: Ole Dominguez MD;  Location:  [facility];  Service: Orthopedics   • AUGMENTATION MAMMAPLASTY Bilateral 1987    silicone    • COLONOSCOPY      2017   • COSMETIC SURGERY      tummy tuck and facelift   • HARDWARE REMOVAL Left 6/24/2019    Procedure: ANKLE/FOOT HARDWARE REMOVAL;  Surgeon: Ole Dominguez MD;  Location:  [facility];  Service: Orthopedics     Family History   Problem Relation Age of Onset   • Cancer Mother    • Hypertension Mother    • Cancer Father    • Hypertension Father    • Heart attack Father    • Breast cancer Neg Hx    • Ovarian cancer Neg Hx       Social History     Socioeconomic History   • Marital status:      Spouse name: Not on file   • Number of children: Not on file   • Years of education: Not on file   • Highest education level: Not on file   Tobacco Use   • Smoking status: Never Smoker   • Smokeless tobacco: Never Used   Substance and Sexual Activity   • Alcohol use: Yes     Comment: social   • Drug use: No   • Sexual activity: Defer        Review of Systems   Constitutional: Negative.    HENT: Negative.    Eyes: Negative.    Respiratory: Negative.    Cardiovascular: Negative.    Gastrointestinal: Negative.    Endocrine: Negative.    Genitourinary: Negative.    Musculoskeletal: Positive for arthralgias.   Skin: Negative.    Allergic/Immunologic: Negative.    Neurological: Negative.    Hematological: Negative.    Psychiatric/Behavioral: Negative.        The following portions of the patient's history were reviewed and updated as appropriate: allergies, current medications, past family history, past medical history, past social history, past surgical  "history and problem list.    Physical Exam:   Pulse 91   Ht 162.6 cm (64.02\")   Wt 62 kg (136 lb 11 oz)   SpO2 97%   BMI 23.45 kg/m²   GENERAL: Body habitus: normal weight for height    Lower extremity edema: Left: trace; Right: trace    Gait: normal     Mental Status:  awake and alert; oriented to person, place, and time  MSK:  Tibia:  Right:  non tender;         Ankle:  Right: non tender;        Foot:  Right:  No tenderness in the plantar fascia, slightly tender over the second metatarsal;     NEURO Sensation:  intact    Medical Decision Making    Data Review:   none    Assessment/Plan/Diagnosis/Treatment Options:   1. Plantar fasciitis  Significantly improved.  Continue stretching.  Wear the night splint if needed.  I will be happy to see her anytime                            "

## 2020-01-02 ENCOUNTER — HOSPITAL ENCOUNTER (OUTPATIENT)
Dept: MAMMOGRAPHY | Facility: HOSPITAL | Age: 65
Discharge: HOME | End: 2020-01-02
Admitting: FAMILY MEDICINE

## 2020-01-02 DIAGNOSIS — Z12.31 VISIT FOR SCREENING MAMMOGRAM: ICD-10-CM

## 2020-01-02 PROCEDURE — 77063 BREAST TOMOSYNTHESIS BI: CPT

## 2020-01-02 PROCEDURE — 77067 SCR MAMMO BI INCL CAD: CPT

## 2020-01-02 PROCEDURE — 77067 SCR MAMMO BI INCL CAD: CPT | Performed by: RADIOLOGY

## 2020-01-02 PROCEDURE — 77063 BREAST TOMOSYNTHESIS BI: CPT | Performed by: RADIOLOGY

## 2020-01-13 ENCOUNTER — TRANSCRIBE ORDERS (OUTPATIENT)
Dept: ADMINISTRATIVE | Facility: HOSPITAL | Age: 65
End: 2020-01-13

## 2020-01-13 DIAGNOSIS — G57.50 TTS (TARSAL TUNNEL SYNDROME), UNSPECIFIED LATERALITY: Primary | ICD-10-CM

## 2020-01-20 ENCOUNTER — HOSPITAL ENCOUNTER (OUTPATIENT)
Dept: MRI IMAGING | Facility: HOSPITAL | Age: 65
Discharge: HOME | End: 2020-01-20
Admitting: FAMILY MEDICINE

## 2020-01-20 DIAGNOSIS — G57.50 TTS (TARSAL TUNNEL SYNDROME), UNSPECIFIED LATERALITY: ICD-10-CM

## 2020-01-20 PROCEDURE — 0 GADOBENATE DIMEGLUMINE 529 MG/ML SOLUTION: Performed by: FAMILY MEDICINE

## 2020-01-20 PROCEDURE — A9577 INJ MULTIHANCE: HCPCS | Performed by: FAMILY MEDICINE

## 2020-01-20 PROCEDURE — 82565 ASSAY OF CREATININE: CPT

## 2020-01-20 PROCEDURE — 73720 MRI LWR EXTREMITY W/O&W/DYE: CPT

## 2020-01-20 RX ADMIN — GADOBENATE DIMEGLUMINE 13 ML: 529 INJECTION, SOLUTION INTRAVENOUS at 14:10

## 2020-01-23 LAB — CREAT BLDA-MCNC: 0.9 MG/DL (ref 0.6–1.3)

## 2020-05-18 ENCOUNTER — OFFICE VISIT (OUTPATIENT)
Dept: PREADMISSION TESTING | Facility: HOSPITAL | Age: 65
End: 2020-05-18

## 2020-05-18 PROCEDURE — U0002 COVID-19 LAB TEST NON-CDC: HCPCS | Performed by: INTERNAL MEDICINE

## 2020-05-18 PROCEDURE — U0004 COV-19 TEST NON-CDC HGH THRU: HCPCS | Performed by: INTERNAL MEDICINE

## 2020-05-19 ENCOUNTER — APPOINTMENT (OUTPATIENT)
Dept: PREADMISSION TESTING | Facility: HOSPITAL | Age: 65
End: 2020-05-19

## 2020-05-19 LAB
REF LAB TEST METHOD: NORMAL
SARS-COV-2 RNA RESP QL NAA+PROBE: NOT DETECTED

## 2020-05-20 ENCOUNTER — AMBULATORY SURGICAL CENTER (OUTPATIENT)
Dept: URBAN - METROPOLITAN AREA SURGERY 10 | Facility: SURGERY | Age: 65
End: 2020-05-20

## 2020-05-20 ENCOUNTER — OFFICE (OUTPATIENT)
Dept: URBAN - METROPOLITAN AREA PATHOLOGY 4 | Facility: PATHOLOGY | Age: 65
End: 2020-05-20

## 2020-05-20 DIAGNOSIS — K22.2 ESOPHAGEAL OBSTRUCTION: ICD-10-CM

## 2020-05-20 DIAGNOSIS — F45.8 OTHER SOMATOFORM DISORDERS: ICD-10-CM

## 2020-05-20 DIAGNOSIS — K29.50 UNSPECIFIED CHRONIC GASTRITIS WITHOUT BLEEDING: ICD-10-CM

## 2020-05-20 DIAGNOSIS — R13.10 DYSPHAGIA, UNSPECIFIED: ICD-10-CM

## 2020-05-20 DIAGNOSIS — R07.89 OTHER CHEST PAIN: ICD-10-CM

## 2020-05-20 DIAGNOSIS — K22.70 BARRETT'S ESOPHAGUS WITHOUT DYSPLASIA: ICD-10-CM

## 2020-05-20 DIAGNOSIS — K29.60 OTHER GASTRITIS WITHOUT BLEEDING: ICD-10-CM

## 2020-05-20 DIAGNOSIS — K21.0 GASTRO-ESOPHAGEAL REFLUX DISEASE WITH ESOPHAGITIS: ICD-10-CM

## 2020-05-20 DIAGNOSIS — K30 FUNCTIONAL DYSPEPSIA: ICD-10-CM

## 2020-05-20 DIAGNOSIS — K22.4 DYSKINESIA OF ESOPHAGUS: ICD-10-CM

## 2020-05-20 PROCEDURE — 43239 EGD BIOPSY SINGLE/MULTIPLE: CPT | Mod: 59 | Performed by: INTERNAL MEDICINE

## 2020-05-20 PROCEDURE — 43249 ESOPH EGD DILATION <30 MM: CPT | Performed by: INTERNAL MEDICINE

## 2020-05-20 PROCEDURE — 88305 TISSUE EXAM BY PATHOLOGIST: CPT | Performed by: INTERNAL MEDICINE

## 2020-09-17 ENCOUNTER — OFFICE (OUTPATIENT)
Dept: URBAN - METROPOLITAN AREA CLINIC 4 | Facility: CLINIC | Age: 65
End: 2020-09-17

## 2020-09-17 VITALS — TEMPERATURE: 97.6 F | WEIGHT: 143 LBS | HEIGHT: 64 IN

## 2020-09-17 DIAGNOSIS — R19.4 CHANGE IN BOWEL HABIT: ICD-10-CM

## 2020-09-17 DIAGNOSIS — R10.84 GENERALIZED ABDOMINAL PAIN: ICD-10-CM

## 2020-09-17 DIAGNOSIS — R19.7 DIARRHEA, UNSPECIFIED: ICD-10-CM

## 2020-09-17 DIAGNOSIS — R14.0 ABDOMINAL DISTENSION (GASEOUS): ICD-10-CM

## 2020-09-17 PROCEDURE — 99214 OFFICE O/P EST MOD 30 MIN: CPT | Performed by: NURSE PRACTITIONER

## 2020-12-17 ENCOUNTER — OFFICE (OUTPATIENT)
Dept: URBAN - METROPOLITAN AREA CLINIC 4 | Facility: CLINIC | Age: 65
End: 2020-12-17

## 2020-12-17 VITALS
DIASTOLIC BLOOD PRESSURE: 77 MMHG | TEMPERATURE: 97 F | HEIGHT: 64 IN | SYSTOLIC BLOOD PRESSURE: 118 MMHG | WEIGHT: 146 LBS

## 2020-12-17 DIAGNOSIS — K22.70 BARRETT'S ESOPHAGUS WITHOUT DYSPLASIA: ICD-10-CM

## 2020-12-17 DIAGNOSIS — K59.09 OTHER CONSTIPATION: ICD-10-CM

## 2020-12-17 DIAGNOSIS — K30 FUNCTIONAL DYSPEPSIA: ICD-10-CM

## 2020-12-17 PROCEDURE — 99214 OFFICE O/P EST MOD 30 MIN: CPT | Performed by: NURSE PRACTITIONER

## 2020-12-17 RX ORDER — OMEPRAZOLE 40 MG/1
40 CAPSULE, DELAYED RELEASE ORAL
Qty: 90 | Refills: 4 | Status: ACTIVE
Start: 2020-05-27

## 2020-12-17 RX ORDER — BUSPIRONE HYDROCHLORIDE 10 MG/1
TABLET ORAL
Qty: 180 | Refills: 1 | Status: ACTIVE

## 2021-06-18 ENCOUNTER — TRANSCRIBE ORDERS (OUTPATIENT)
Dept: DIABETES SERVICES | Facility: HOSPITAL | Age: 66
End: 2021-06-18

## 2021-06-18 DIAGNOSIS — R73.03 PREDIABETES: Primary | ICD-10-CM

## 2021-12-15 ENCOUNTER — OFFICE (OUTPATIENT)
Dept: URBAN - METROPOLITAN AREA CLINIC 4 | Facility: CLINIC | Age: 66
End: 2021-12-15

## 2021-12-15 VITALS — HEIGHT: 64 IN | DIASTOLIC BLOOD PRESSURE: 76 MMHG | WEIGHT: 151 LBS | SYSTOLIC BLOOD PRESSURE: 119 MMHG

## 2021-12-15 DIAGNOSIS — K59.00 CONSTIPATION, UNSPECIFIED: ICD-10-CM

## 2021-12-15 DIAGNOSIS — K30 FUNCTIONAL DYSPEPSIA: ICD-10-CM

## 2021-12-15 PROCEDURE — 99214 OFFICE O/P EST MOD 30 MIN: CPT | Performed by: NURSE PRACTITIONER

## 2021-12-15 RX ORDER — OMEPRAZOLE 40 MG/1
40 CAPSULE, DELAYED RELEASE ORAL
Qty: 90 | Refills: 4 | Status: ACTIVE
Start: 2021-12-15

## 2021-12-15 RX ORDER — BUSPIRONE HYDROCHLORIDE 10 MG/1
TABLET ORAL
Qty: 180 | Refills: 1 | Status: ACTIVE

## 2022-03-02 ENCOUNTER — TRANSCRIBE ORDERS (OUTPATIENT)
Dept: ADMINISTRATIVE | Facility: HOSPITAL | Age: 67
End: 2022-03-02

## 2022-03-02 DIAGNOSIS — Z12.31 VISIT FOR SCREENING MAMMOGRAM: Primary | ICD-10-CM

## 2022-03-07 NOTE — PROGRESS NOTES
Orthopaedic Clinic Note:  Post Op    Chief Complaint   Patient presents with   • Left Shoulder - Pain   • Post-op     3 week f/u; 9 weeks status post Left Ankle Open Reduction Internal Fixation 03/20/2019        HPI    Ms. Tucker is 9  week(s) s/p open reduction internal fixation left ankle fracture with syndesmotic stabilization.  She rates her pain 0/10 on the pain scale.  She has been nonweightbearing as instructed.  She denies fevers chills or constitutional symptoms.  Patient is also complaining of left chronic shoulder pain that has been ongoing for about 4 months.  She states that she was lifting a pack of birdseed and felt a pop in her shoulder as well as some pain.  The pain gradually improved but she has been having some intermittent pain throughout the shoulder with certain activities.  She is concerned she may have damaged the shoulder.  Overall she is doing well.    Past Medical History:   Diagnosis Date   • Anxiety    • Cancer (CMS/HCC)     squamous skin cancer removed    • Cataracts, bilateral    • Diverticulitis    • High cholesterol    • Hypertension    • Hypothyroid    • IBS (irritable bowel syndrome)    • PONV (postoperative nausea and vomiting)    • Stress incontinence    • Wears glasses       Past Surgical History:   Procedure Laterality Date   • ANKLE OPEN REDUCTION INTERNAL FIXATION Right 2003    orif   • ANKLE OPEN REDUCTION INTERNAL FIXATION Left 3/20/2019    Procedure: ANKLE OPEN REDUCTION INTERNAL FIXATION LEFT;  Surgeon: Ole Dominguez MD;  Location: [facility];  Service: Orthopedics   • AUGMENTATION MAMMAPLASTY Bilateral 1987    silicone    • COLONOSCOPY     • COSMETIC SURGERY      tummy tuck and facelift      Social History     Socioeconomic History   • Marital status:      Spouse name: Not on file   • Number of children: Not on file   • Years of education: Not on file   • Highest education level: Not on file   Tobacco Use   • Smoking status: Never Smoker   • Smokeless  Pt due for 6 month visit .   "tobacco: Never Used   Substance and Sexual Activity   • Alcohol use: Yes     Comment: social   • Drug use: No   • Sexual activity: Defer      Current Outpatient Medications on File Prior to Visit   Medication Sig Dispense Refill   • CALCIUM CITRATE-VITAMIN D PO Take 1,000 mg by mouth.     • Cholecalciferol (VITAMIN D PO) Take  by mouth.     • FLUoxetine (PROzac) 40 MG capsule TAKE 1 CAPSULE BY MOUTH ONE TIME A DAY  1   • levothyroxine (SYNTHROID, LEVOTHROID) 50 MCG tablet TAKE 1 TABLET BY MOUTH IN THE MORNING on an empty stomach. do not eat for 30 minutes  1   • lisinopril (PRINIVIL,ZESTRIL) 5 MG tablet TAKE 1 TABLET BY MOUTH IN THE MORNING FOR BLOOD PRESSURE  1   • pravastatin (PRAVACHOL) 20 MG tablet Take 20 mg by mouth every night at bedtime.  1   • vitamin D (ERGOCALCIFEROL) 92708 units capsule capsule Take 50,000 Units by mouth 1 (One) Time Per Week.  0   • zolpidem (AMBIEN) 10 MG tablet Take 5 mg by mouth every night at bedtime. Patient states she takes 1/3 of 10 mg tab  2   • [DISCONTINUED] cephalexin (KEFLEX) 500 MG capsule Take 1 capsule by mouth Every 6 (Six) Hours. 28 capsule 0   • [DISCONTINUED] meloxicam (MOBIC) 15 MG tablet Take 15 mg by mouth Every Morning.  3     No current facility-administered medications on file prior to visit.       Allergies   Allergen Reactions   • Penicillins Rash        Review of Systems   Constitutional: Negative.    HENT: Negative.    Eyes: Negative.    Respiratory: Negative.    Cardiovascular: Negative.    Gastrointestinal: Negative.    Endocrine: Negative.    Genitourinary: Negative.    Musculoskeletal:        Left Ankle ORIF f/u   Skin: Negative.    Allergic/Immunologic: Negative.    Neurological: Negative.    Hematological: Negative.    Psychiatric/Behavioral: Negative.         Physical Exam  Pulse 78, height 162.6 cm (64.02\"), weight 62.1 kg (136 lb 14.5 oz), SpO2 98 %, not currently breastfeeding.    Body mass index is 23.49 kg/m².    GENERAL APPEARANCE: awake, alert, " oriented, in no acute distress and well developed, well nourished  LUNGS:  breathing nonlabored  EXTREMITIES: no clubbing, cyanosis        Left Upper Extremity Exam:    Patient has full active and passive range of motion in forward flexion and abduction as well as internal and external rotation relative to contralateral side.  5/5 rotator cuff strength throughout.  Negative [place]'s negative Mak negative speeds and negative Yergason tests.  Intact EPL, FPL, EDC, FDP, FDS, interosseous, wrist flexion, wrist extension, biceps, triceps, deltoid. Sensation intact to light touch to median, radial, ulnar, and axillary nerves. 2+ palpable radial pulse.    Left Lower Extremity Exam:    Medial incision is well-healed.  Lateral incision is well-healed with no evidence of erythema or drainage.  Ankle range of motion is symmetric to contralateral side.  No evidence of ankle swelling.     Palpable dorsalis pedis and posterior tibial pulses.  SENSATION TO LIGHT TOUCH:  DEEP PERONEAL/SUPERFICIAL PERONEAL/SURAL/SAPHENOUS/TIBIAL:   intact    EDEMA:  no  _______________________________________________________________  _______________________________________________________________    RADIOGRAPHIC FINDINGS:   Indication: Left shoulder pain, status post open reduction internal fixation left ankle    Comparison: Todays xrays were compared to previous xrays from 5/3/2019     Left shoulder 3 views: Radiographs demonstrate mild arthritic changes of the acromioclavicular joint.  No significant arthritic changes of the glenohumeral joint seen radiographically.    Left ankle 3 views: Radiographs demonstrate hardware in place with a consolidated ankle fracture.  Ankle mortise is symmetric.  No change in alignment compared to prior radiographs.    Assessment/Plan:   Diagnosis Plan   1. Painful orthopaedic hardware (CMS/HCC)  Case Request    ceFAZolin (ANCEF) 2 g in sodium chloride 0.9 % 100 mL IVPB    acetaminophen (TYLENOL) tablet 975 mg     CBC and Differential    Comprehensive metabolic panel    Protime-INR   2. Status post open reduction with internal fixation (ORIF) of fracture of ankle  XR Ankle 3+ View Left   3. Chronic left shoulder pain  XR Shoulder 2+ View Left     I discussed with the patient that I see no clinical or radiographic evidence of complication or apparent mechanical injury to the shoulder.  She has excellent rotator cuff strength and full range of motion.  I recommended anti-inflammatories for this.  In regards to the ankle, it is well-healed.  I recommended hardware removal prior to initiation of weightbearing.  We will fill out paperwork and schedule her for hardware removal at 3 months period.  She is agreeable to this plan.    The patient has clinical and radiographic evidence of symptomatic left ankle hardware which is severely restricting the patient's activities as well as quality of life. The recommendation at this time is to proceed with a left ankle syndesmotic screw removal with the goal to improve patient function, decrease pain, and improve mobility. The risks, benefits, potential complications, and alternatives were discussed with the patient in detail. Risks included but were not limited to bleeding, infection, anesthesia risks, damage to neurovascular structures, recurrent injury to syndesmosis, pain, continued pain, iatrogenic fracture, possible need for future surgery including the potential for amputation, blood clots, myocardial infarction, stroke, and death. Tessie-operative blood management and the potential for blood transfusion were discussed with risks and options clearly outlined. Specific details of the surgical procedure, hospitalization, recovery, rehabilitation, and long-term precautions were also presented. Pre-operative teaching was provided. Implant/prosthesis and equipment selection was outlined, and the many options available were explained; the final choice will be made at the time of the  procedure to match the anatomy and condition of the bone, ligaments, tendons, and muscles. Given this instruction, the patient elected to proceed with the left ankle hardware removal. The patient will be seen by pre-admission testing for pre-operative optimization and risk assessment and will be scheduled for surgery once this is completed.      Ole Dominguez MD  05/24/19  11:48 AM

## 2022-03-17 ENCOUNTER — OFFICE (OUTPATIENT)
Dept: URBAN - METROPOLITAN AREA CLINIC 4 | Facility: CLINIC | Age: 67
End: 2022-03-17

## 2022-03-17 VITALS — WEIGHT: 154 LBS | SYSTOLIC BLOOD PRESSURE: 122 MMHG | DIASTOLIC BLOOD PRESSURE: 76 MMHG | HEIGHT: 64 IN

## 2022-03-17 DIAGNOSIS — K22.70 BARRETT'S ESOPHAGUS WITHOUT DYSPLASIA: ICD-10-CM

## 2022-03-17 DIAGNOSIS — R10.84 GENERALIZED ABDOMINAL PAIN: ICD-10-CM

## 2022-03-17 DIAGNOSIS — K59.09 OTHER CONSTIPATION: ICD-10-CM

## 2022-03-17 DIAGNOSIS — K30 FUNCTIONAL DYSPEPSIA: ICD-10-CM

## 2022-03-17 PROCEDURE — 99214 OFFICE O/P EST MOD 30 MIN: CPT | Performed by: NURSE PRACTITIONER

## 2022-03-25 ENCOUNTER — APPOINTMENT (OUTPATIENT)
Dept: MAMMOGRAPHY | Facility: HOSPITAL | Age: 67
End: 2022-03-25

## 2022-04-29 ENCOUNTER — HOSPITAL ENCOUNTER (OUTPATIENT)
Dept: MAMMOGRAPHY | Facility: HOSPITAL | Age: 67
Discharge: HOME | End: 2022-04-29
Admitting: FAMILY MEDICINE

## 2022-04-29 DIAGNOSIS — Z12.31 VISIT FOR SCREENING MAMMOGRAM: ICD-10-CM

## 2022-04-29 PROCEDURE — 77063 BREAST TOMOSYNTHESIS BI: CPT

## 2022-04-29 PROCEDURE — 77063 BREAST TOMOSYNTHESIS BI: CPT | Performed by: RADIOLOGY

## 2022-04-29 PROCEDURE — 77067 SCR MAMMO BI INCL CAD: CPT | Performed by: RADIOLOGY

## 2022-04-29 PROCEDURE — 77067 SCR MAMMO BI INCL CAD: CPT

## 2022-09-15 ENCOUNTER — OFFICE (OUTPATIENT)
Dept: URBAN - METROPOLITAN AREA CLINIC 4 | Facility: CLINIC | Age: 67
End: 2022-09-15
Payer: COMMERCIAL

## 2022-09-15 DIAGNOSIS — R14.0 ABDOMINAL DISTENSION (GASEOUS): ICD-10-CM

## 2022-09-15 DIAGNOSIS — K59.09 OTHER CONSTIPATION: ICD-10-CM

## 2022-09-15 DIAGNOSIS — R10.84 GENERALIZED ABDOMINAL PAIN: ICD-10-CM

## 2022-09-15 DIAGNOSIS — K30 FUNCTIONAL DYSPEPSIA: ICD-10-CM

## 2022-09-15 DIAGNOSIS — K22.70 BARRETT'S ESOPHAGUS WITHOUT DYSPLASIA: ICD-10-CM

## 2022-09-15 PROCEDURE — 99214 OFFICE O/P EST MOD 30 MIN: CPT | Performed by: NURSE PRACTITIONER

## 2023-01-05 VITALS — SYSTOLIC BLOOD PRESSURE: 132 MMHG | HEIGHT: 64 IN | DIASTOLIC BLOOD PRESSURE: 88 MMHG

## 2023-02-10 ENCOUNTER — AMBULATORY SURGICAL CENTER (OUTPATIENT)
Dept: URBAN - METROPOLITAN AREA SURGERY 10 | Facility: SURGERY | Age: 68
End: 2023-02-10

## 2023-02-10 DIAGNOSIS — K57.30 DIVERTICULOSIS OF LARGE INTESTINE WITHOUT PERFORATION OR ABS: ICD-10-CM

## 2023-02-10 DIAGNOSIS — K64.1 SECOND DEGREE HEMORRHOIDS: ICD-10-CM

## 2023-02-10 DIAGNOSIS — Z12.11 ENCOUNTER FOR SCREENING FOR MALIGNANT NEOPLASM OF COLON: ICD-10-CM

## 2023-02-10 DIAGNOSIS — Z86.010 PERSONAL HISTORY OF COLONIC POLYPS: ICD-10-CM

## 2023-02-10 PROCEDURE — 45378 DIAGNOSTIC COLONOSCOPY: CPT | Mod: PT | Performed by: INTERNAL MEDICINE

## 2023-03-15 ENCOUNTER — OFFICE (OUTPATIENT)
Dept: URBAN - METROPOLITAN AREA CLINIC 4 | Facility: CLINIC | Age: 68
End: 2023-03-15
Payer: COMMERCIAL

## 2023-03-15 VITALS
DIASTOLIC BLOOD PRESSURE: 82 MMHG | HEIGHT: 64 IN | DIASTOLIC BLOOD PRESSURE: 92 MMHG | SYSTOLIC BLOOD PRESSURE: 134 MMHG | SYSTOLIC BLOOD PRESSURE: 124 MMHG | WEIGHT: 155 LBS

## 2023-03-15 DIAGNOSIS — K30 FUNCTIONAL DYSPEPSIA: ICD-10-CM

## 2023-03-15 DIAGNOSIS — R14.0 ABDOMINAL DISTENSION (GASEOUS): ICD-10-CM

## 2023-03-15 DIAGNOSIS — K59.09 OTHER CONSTIPATION: ICD-10-CM

## 2023-03-15 DIAGNOSIS — K22.70 BARRETT'S ESOPHAGUS WITHOUT DYSPLASIA: ICD-10-CM

## 2023-03-15 DIAGNOSIS — R15.0 INCOMPLETE DEFECATION: ICD-10-CM

## 2023-03-15 PROCEDURE — 99214 OFFICE O/P EST MOD 30 MIN: CPT | Performed by: NURSE PRACTITIONER

## 2023-08-28 ENCOUNTER — TRANSCRIBE ORDERS (OUTPATIENT)
Dept: ADMINISTRATIVE | Facility: HOSPITAL | Age: 68
End: 2023-08-28
Payer: MEDICARE

## 2023-08-28 DIAGNOSIS — Z12.31 ENCOUNTER FOR SCREENING MAMMOGRAM FOR BREAST CANCER: Primary | ICD-10-CM

## 2023-10-04 ENCOUNTER — HOSPITAL ENCOUNTER (OUTPATIENT)
Dept: MAMMOGRAPHY | Facility: HOSPITAL | Age: 68
Discharge: HOME | End: 2023-10-04
Admitting: INTERNAL MEDICINE
Payer: MEDICARE

## 2023-10-04 DIAGNOSIS — Z12.31 ENCOUNTER FOR SCREENING MAMMOGRAM FOR BREAST CANCER: ICD-10-CM

## 2023-10-04 PROCEDURE — 77067 SCR MAMMO BI INCL CAD: CPT

## 2023-10-04 PROCEDURE — 77063 BREAST TOMOSYNTHESIS BI: CPT

## 2023-10-19 ENCOUNTER — HOSPITAL ENCOUNTER (OUTPATIENT)
Dept: GENERAL RADIOLOGY | Facility: HOSPITAL | Age: 68
Discharge: HOME | End: 2023-10-19
Admitting: INTERNAL MEDICINE
Payer: MEDICARE

## 2023-10-19 ENCOUNTER — TRANSCRIBE ORDERS (OUTPATIENT)
Dept: ADMINISTRATIVE | Facility: HOSPITAL | Age: 68
End: 2023-10-19
Payer: MEDICARE

## 2023-10-19 DIAGNOSIS — R06.09 DOE (DYSPNEA ON EXERTION): Primary | ICD-10-CM

## 2023-10-19 DIAGNOSIS — R06.09 DOE (DYSPNEA ON EXERTION): ICD-10-CM

## 2023-10-19 PROCEDURE — 71046 X-RAY EXAM CHEST 2 VIEWS: CPT

## 2024-03-21 ENCOUNTER — OFFICE (OUTPATIENT)
Dept: URBAN - METROPOLITAN AREA CLINIC 4 | Facility: CLINIC | Age: 69
End: 2024-03-21

## 2024-03-21 VITALS — HEIGHT: 64 IN | DIASTOLIC BLOOD PRESSURE: 84 MMHG | WEIGHT: 147 LBS | SYSTOLIC BLOOD PRESSURE: 130 MMHG

## 2024-03-21 DIAGNOSIS — R13.10 DYSPHAGIA, UNSPECIFIED: ICD-10-CM

## 2024-03-21 DIAGNOSIS — R09.A2 FOREIGN BODY SENSATION, THROAT: ICD-10-CM

## 2024-03-21 DIAGNOSIS — K30 FUNCTIONAL DYSPEPSIA: ICD-10-CM

## 2024-03-21 DIAGNOSIS — K22.70 BARRETT'S ESOPHAGUS WITHOUT DYSPLASIA: ICD-10-CM

## 2024-03-21 DIAGNOSIS — K59.03 DRUG INDUCED CONSTIPATION: ICD-10-CM

## 2024-03-21 PROCEDURE — 99214 OFFICE O/P EST MOD 30 MIN: CPT | Performed by: NURSE PRACTITIONER

## 2024-04-10 ENCOUNTER — OFFICE VISIT (OUTPATIENT)
Dept: NEUROLOGY | Facility: CLINIC | Age: 69
End: 2024-04-10
Payer: MEDICARE

## 2024-04-10 VITALS
WEIGHT: 146.6 LBS | DIASTOLIC BLOOD PRESSURE: 78 MMHG | BODY MASS INDEX: 26.98 KG/M2 | OXYGEN SATURATION: 97 % | HEIGHT: 62 IN | HEART RATE: 78 BPM | SYSTOLIC BLOOD PRESSURE: 122 MMHG

## 2024-04-10 DIAGNOSIS — G20.A1 PARKINSON'S DISEASE, UNSPECIFIED WHETHER DYSKINESIA PRESENT, UNSPECIFIED WHETHER MANIFESTATIONS FLUCTUATE: Primary | ICD-10-CM

## 2024-04-10 PROBLEM — H04.123 DRY EYES: Status: ACTIVE | Noted: 2020-05-28

## 2024-04-10 PROBLEM — K21.9 ACID REFLUX: Status: ACTIVE | Noted: 2023-11-03

## 2024-04-10 PROBLEM — E78.2 MIXED HYPERLIPIDEMIA: Status: ACTIVE | Noted: 2023-10-24

## 2024-04-10 PROBLEM — M25.549 ARTHRALGIA OF HAND: Status: ACTIVE | Noted: 2023-11-29

## 2024-04-10 PROBLEM — M79.672 LEFT FOOT PAIN: Status: ACTIVE | Noted: 2022-04-19

## 2024-04-10 PROBLEM — F41.9 ANXIETY: Status: ACTIVE | Noted: 2023-11-03

## 2024-04-10 PROBLEM — Z96.1 PSEUDOPHAKIA: Status: ACTIVE | Noted: 2022-02-02

## 2024-04-10 PROBLEM — I47.19 ATRIAL TACHYCARDIA: Chronic | Status: ACTIVE | Noted: 2023-10-24

## 2024-04-10 PROBLEM — I10 PRIMARY HYPERTENSION: Status: ACTIVE | Noted: 2023-10-24

## 2024-04-10 PROCEDURE — 1159F MED LIST DOCD IN RCRD: CPT | Performed by: NURSE PRACTITIONER

## 2024-04-10 PROCEDURE — 99204 OFFICE O/P NEW MOD 45 MIN: CPT | Performed by: NURSE PRACTITIONER

## 2024-04-10 PROCEDURE — 3074F SYST BP LT 130 MM HG: CPT | Performed by: NURSE PRACTITIONER

## 2024-04-10 PROCEDURE — 1160F RVW MEDS BY RX/DR IN RCRD: CPT | Performed by: NURSE PRACTITIONER

## 2024-04-10 PROCEDURE — 3078F DIAST BP <80 MM HG: CPT | Performed by: NURSE PRACTITIONER

## 2024-04-10 RX ORDER — LINACLOTIDE 145 UG/1
145 CAPSULE, GELATIN COATED ORAL
COMMUNITY
Start: 2024-04-07

## 2024-04-10 RX ORDER — ERYTHROMYCIN 5 MG/G
OINTMENT OPHTHALMIC DAILY
COMMUNITY
End: 2024-04-10

## 2024-04-10 RX ORDER — ESCITALOPRAM OXALATE 20 MG/1
20 TABLET ORAL
COMMUNITY

## 2024-04-10 RX ORDER — ZOLPIDEM TARTRATE 5 MG/1
5 TABLET ORAL
COMMUNITY

## 2024-04-10 RX ORDER — BUSPIRONE HYDROCHLORIDE 10 MG/1
10 TABLET ORAL 2 TIMES DAILY
COMMUNITY

## 2024-04-10 RX ORDER — UBIDECARENONE 50 MG
50 CAPSULE ORAL DAILY
COMMUNITY
End: 2024-04-10

## 2024-04-10 RX ORDER — OMEPRAZOLE 40 MG/1
40 CAPSULE, DELAYED RELEASE ORAL EVERY OTHER DAY
COMMUNITY

## 2024-04-10 RX ORDER — CYCLOSPORINE 0.5 MG/ML
1 EMULSION OPHTHALMIC EVERY 12 HOURS
COMMUNITY
End: 2024-04-10

## 2024-04-10 NOTE — PROGRESS NOTES
Neuro Office Visit      Encounter Date: 04/10/2024   Patient Name: Merry Tucker  : 1955   MRN: [id number]   PCP: Dr Bello  Chief Complaint:    Chief Complaint   Patient presents with    Tremors       History of Present Illness: Merry Tucker is a 69 y.o. female who is here today in Neurology for  tremor, balance disorder.      Tremor  2 Years ago noticed small writing. Last year noticed tremor bilat hands when trying to do something. No temor at rest. Trouble putting on makeup.   Slurred speech, has trouble annunciating her words. No dysphagia. Has trouble turning over in bed. Has trouble rising from chair. Feels she is generally slower with bradykinesia. Has disturbing dreams of late  or can't find her car. No hallucinations.    She is taking compounded semiglutide from a pharmacy on line from [place] w increased constipation.         Balance Problem  Has trouble changing position while walking. Takes many little steps to turn direction of steps.  Has trouble initiating step. Has had multiple falls. Usually falls forward but  last time fell backward while taking off shoes.   Fell in closet and fractured wrist.      Labs from PCP reviewed.    PMH: HTN, HLD, GERD, hypothyroidism, insomnia, OA, anxiety,  HUBER Osteopenia, chronic neck pain.  FH:-PD. Mother with lung cancer. Father w prostate cancer. Brother  from OD. Children are alive and well  SH: +caffeine, +etoh 2-3/week, -drug. 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  Subjective      Past Medical History:   Past Medical History:   Diagnosis Date    Anxiety     Arthritis     Cancer     squamous skin cancer removed     Cataracts, bilateral     Difficulty walking     Diverticulitis     High cholesterol     Hypertension     Hypothyroid     IBS (irritable bowel syndrome)     PONV (postoperative nausea and vomiting)     Sleep apnea     Stress incontinence     Wears glasses        Past Surgical History:   Past Surgical  History:   Procedure Laterality Date    ANKLE OPEN REDUCTION INTERNAL FIXATION Right 2003    orif    ANKLE OPEN REDUCTION INTERNAL FIXATION Left 03/20/2019    Procedure: ANKLE OPEN REDUCTION INTERNAL FIXATION LEFT;  Surgeon: Ole Dominguez MD;  Location:  [facility];  Service: Orthopedics    AUGMENTATION MAMMAPLASTY Bilateral 1987    silicone     COLONOSCOPY      2017    COSMETIC SURGERY      tummy tuck and facelift    HARDWARE REMOVAL Left 06/24/2019    Procedure: ANKLE/FOOT HARDWARE REMOVAL;  Surgeon: Ole Dominguez MD;  Location:  [facility];  Service: Orthopedics       Family History:   Family History   Problem Relation Age of Onset    Cancer Mother     Hypertension Mother     Cancer Father     Hypertension Father     Heart attack Father     Breast cancer Maternal Aunt         DX AGE UNKNOWN    Ovarian cancer Neg Hx        Social History:   Social History     Socioeconomic History    Marital status:    Tobacco Use    Smoking status: Never    Smokeless tobacco: Never   Substance and Sexual Activity    Alcohol use: Yes     Alcohol/week: 2.0 standard drinks of alcohol     Types: 2 Shots of liquor per week     Comment: social    Drug use: No    Sexual activity: Not Currently     Partners: Male       Medications:     Current Outpatient Medications:     busPIRone (BUSPAR) 10 MG tablet, Take 1 tablet by mouth 2 (Two) Times a Day., Disp: , Rfl:     CALCIUM CITRATE-VITAMIN D PO, Take 1,000 mg by mouth., Disp: , Rfl:     escitalopram (LEXAPRO) 20 MG tablet, Take 1 tablet by mouth every night at bedtime., Disp: , Rfl:     levothyroxine (SYNTHROID, LEVOTHROID) 75 MCG tablet, , Disp: , Rfl:     Linzess 145 MCG capsule capsule, Take 1 capsule by mouth Every Morning Before Breakfast., Disp: , Rfl:     meloxicam (MOBIC) 15 MG tablet, Take 1 tablet by mouth Daily., Disp: , Rfl:     omeprazole (priLOSEC) 40 MG capsule, Take 1 capsule by mouth Every Other Day., Disp: , Rfl:     pravastatin (PRAVACHOL) 20 MG tablet,  Take 1 tablet by mouth every night at bedtime., Disp: , Rfl: 1    zolpidem (AMBIEN) 5 MG tablet, Take 1 tablet by mouth every night at bedtime., Disp: , Rfl:     carbidopa-levodopa (Sinemet)  MG per tablet, Take 1 tablet by mouth 3 (Three) Times a Day., Disp: 90 tablet, Rfl: 5    Allergies:   Allergies   Allergen Reactions    Atorvastatin Myalgia     myalgias    Lisinopril Cough     Dry cough    Penicillins Rash     Has pedro luis. Keflex in the past       PHQ-9 Total Score:     STEADI Fall Risk Assessment was completed, and patient is at HIGH risk for falls. Assessment completed on:4/10/2024    Objective     Physical Exam:   Physical Exam  Neurological:      Mental Status: She is oriented to person, place, and time.      Coordination: Finger-Nose-Finger Test abnormal.      Gait: Tandem walk abnormal.      Deep Tendon Reflexes:      Reflex Scores:       Tricep reflexes are 2+ on the right side and 2+ on the left side.       Bicep reflexes are 2+ on the right side and 2+ on the left side.       Brachioradialis reflexes are 2+ on the right side and 2+ on the left side.       Patellar reflexes are 2+ on the right side and 2+ on the left side.       Achilles reflexes are 2+ on the right side and 2+ on the left side.  Psychiatric:         Speech: Speech normal.         Neurologic Exam     Mental Status   Oriented to person, place, and time.   Follows 3 step commands.   Attention: normal. Concentration: normal.   Speech: speech is normal   Level of consciousness: alert  Knowledge: consistent with education.   Normal comprehension.     Cranial Nerves     CN III, IV, VI   CN III: no CN III palsy  CN VI: no CN VI palsy  Nystagmus: none   Diplopia: none  Upgaze: normal  Downgaze: normal  Conjugate gaze: present    CN VIII   Hearing: intact    CN XII   CN XII normal.   Masked face with rare blinking     Motor Exam   Muscle bulk: normal  Overall muscle tone: normal    Strength   Right biceps: 5/5  Left biceps: 5/5  Right  "triceps: 5/5  Left triceps: 5/5  Right interossei: 5/5  Left interossei: 5/5  Right quadriceps: 5/5  Left quadriceps: 5/5  Right anterior tibial: 5/5  Left anterior tibial: 5/5  Right posterior tibial: 5/5  Left posterior tibial: 5/5bradykinesia     Sensory Exam   Light touch normal.     Gait, Coordination, and Reflexes     Gait  Gait: shuffling    Coordination   Finger to nose coordination: abnormal  Tandem walking coordination: abnormal    Tremor   Resting tremor: absent  Action tremor: left arm and right arm    Reflexes   Right brachioradialis: 2+  Left brachioradialis: 2+  Right biceps: 2+  Left biceps: 2+  Right triceps: 2+  Left triceps: 2+  Right patellar: 2+  Left patellar: 2+  Right achilles: 2+  Left achilles: 2+  Right : 2+  Left : 2+Turns en block, migrographia, abnml spiral        Vital Signs:   Vitals:    04/10/24 1055   BP: 122/78   Pulse: 78   SpO2: 97%   Weight: 66.5 kg (146 lb 9.6 oz)   Height: 157.5 cm (62\")     Body mass index is 26.81 kg/m².     Results:   Imaging:   No Images in the past 120 days found..          Assessment / Plan      Assessment/Plan:   Diagnoses and all orders for this visit:    1. Parkinson's disease, unspecified whether dyskinesia present, unspecified whether manifestations fluctuate (Primary)  -     carbidopa-levodopa (Sinemet)  MG per tablet; Take 1 tablet by mouth 3 (Three) Times a Day.  Dispense: 90 tablet; Refill: 5           Patient Education:   Discussed with impressions with pt and . She will call back in 6 weeks to report her progress.    Reviewed medications, potential side effects and signs and symptoms to report. Discussed risk versus benefits of treatment plan with patient and/or family-including medications, labs and radiology that may be ordered. Addressed questions and concerns during visit. Patient and/or family verbalized understanding and agree with plan. Instructed to call the office with any questions and report to ER with any " life-threatening symptoms.     Follow Up:   Return in about 3 months (around 7/10/2024) for Recheck.    During this visit the following were done:  Labs Reviewed [x]    Labs Ordered []    Radiology Reports Reviewed [x]    Radiology Ordered []    PCP Records Reviewed [x]    Referring Provider Records Reviewed []    ER Records Reviewed []    Hospital Records Reviewed []    History Obtained From Family [x]    Radiology Images Reviewed []    Other Reviewed [x]    Records Requested []      Kourtney Lynn, DNP, APRN

## 2024-04-29 ENCOUNTER — TELEPHONE (OUTPATIENT)
Dept: NEUROLOGY | Facility: CLINIC | Age: 69
End: 2024-04-29
Payer: MEDICARE

## 2024-04-29 NOTE — TELEPHONE ENCOUNTER
Caller: CLAUDIA     [place] call back number: [phone]     Which medication are you concerned about: CARB / LEV     Who prescribed you this medication: REBECCA MASON     When did you start taking this medication: 04.11.24    What are your concerns: PT SAID DID GOOD AT FIRST, ON RX AFTER TWO WEEKS STARTED FEELING LIKE DEPRESSED FEEL LIKE HAS A HOLE IN HER [address] WAY TO DESCRIBE     How long have you had these concerns: CALL PLEASE TO GO OVER RX AND SYMPTOMS    PLEASE ADVISE.

## 2024-05-22 ENCOUNTER — TELEPHONE (OUTPATIENT)
Dept: NEUROLOGY | Facility: CLINIC | Age: 69
End: 2024-05-22
Payer: MEDICARE

## 2024-05-22 NOTE — TELEPHONE ENCOUNTER
Caller: Merry Tucker    Relationship: Self    Best call back number: [phone]    What is the best time to reach you:     Who are you requesting to speak with (clinical staff, provider,  specific staff member): LUTHER    Do you know the name of the person who called: LUTHER    What was the call regarding: QUESTIONS PER MESSAGE    Is it okay if the provider responds through MyChart:    CALLED S/W ARSH AT CLINIC AND ADVISED TO SEND A MESSAGE D/T LUTHER IN IS A ROOM WITH A PATIENT.   ADVISED PT AND SHE V/U.      PLEASE CALL & ADVISE

## 2024-05-22 NOTE — TELEPHONE ENCOUNTER
I called pt back to clarify. She feels better mentally and physically. Will continue current dose and follow up at her next visit. May increase dose at that time. No referral needed at this time.

## 2024-05-22 NOTE — TELEPHONE ENCOUNTER
Caller: Merry Tucker    Relationship: Self    Best call back number: [phone]    What is the best time to reach you:     Who are you requesting to speak with (clinical staff, provider,  specific staff member): REBECCA MASON    Do you know the name of the person who called:     What was the call regarding:  PT WAS TOLD TO CALL BACK IN 6 WEEKS.  ALSO, PT HAS SOME QUESTIONS.  PLEASE CALL PT BACK TO DISCUSS AS SHE DIDN'T WANT TO PROVIDE THOSE QUESTIONS ON OUR CALL.

## 2024-05-22 NOTE — TELEPHONE ENCOUNTER
"Patient returned call and stated she feels better mentally but not physically.  Patient felt great for the first ten days after it felt like her \"soul was sucked\" out and this was worse than depression.  Stated no physical symptoms just mental.  Patient feels exhausted all the time and cannot sleep and does and would like to know if she needs a movement disorder specialist.  Patient is working out two times a week.      Returned call to patient and left vm.  "

## 2024-05-31 ENCOUNTER — TELEPHONE (OUTPATIENT)
Dept: NEUROLOGY | Facility: CLINIC | Age: 69
End: 2024-05-31

## 2024-05-31 NOTE — TELEPHONE ENCOUNTER
Provider: GAYATHRI MASON    Caller: PATIENT    Relationship to Patient: SELF    Pharmacy: [id number]    Phone Number: [phone]    Reason for Call: PATIENT CALLED REQUESTING TO SPEAK WITH LUTHER. WOULD LIKE TO SEE ABOUT ADDING A DOSE OF THE CARBIDOPA-LEVODOPA BACK, WOULD LIKE TO TRY 3x DAILY AGAIN. PLEASE ADVISE, THANK YOU.

## 2024-07-15 ENCOUNTER — OFFICE VISIT (OUTPATIENT)
Dept: NEUROLOGY | Facility: CLINIC | Age: 69
End: 2024-07-15
Payer: MEDICARE

## 2024-07-15 VITALS
BODY MASS INDEX: 26.13 KG/M2 | HEIGHT: 62 IN | OXYGEN SATURATION: 98 % | HEART RATE: 84 BPM | DIASTOLIC BLOOD PRESSURE: 76 MMHG | SYSTOLIC BLOOD PRESSURE: 114 MMHG | WEIGHT: 142 LBS

## 2024-07-15 DIAGNOSIS — G20.A1 PARKINSON'S DISEASE, UNSPECIFIED WHETHER DYSKINESIA PRESENT, UNSPECIFIED WHETHER MANIFESTATIONS FLUCTUATE: Primary | ICD-10-CM

## 2024-07-15 PROCEDURE — 3078F DIAST BP <80 MM HG: CPT | Performed by: NURSE PRACTITIONER

## 2024-07-15 PROCEDURE — 3074F SYST BP LT 130 MM HG: CPT | Performed by: NURSE PRACTITIONER

## 2024-07-15 PROCEDURE — 99213 OFFICE O/P EST LOW 20 MIN: CPT | Performed by: NURSE PRACTITIONER

## 2024-07-15 PROCEDURE — 1160F RVW MEDS BY RX/DR IN RCRD: CPT | Performed by: NURSE PRACTITIONER

## 2024-07-15 PROCEDURE — 1159F MED LIST DOCD IN RCRD: CPT | Performed by: NURSE PRACTITIONER

## 2024-07-15 RX ORDER — ESCITALOPRAM OXALATE 10 MG/1
10 TABLET ORAL DAILY
COMMUNITY

## 2024-07-15 NOTE — PROGRESS NOTES
Neuro Office Visit      Encounter Date: 07/15/2024   Patient Name: Merry Tucker  : 1955   MRN: [id number]   PCP: Dr Bello  Chief Complaint:    Chief Complaint   Patient presents with    Parkinson's Disease       History of Present Illness: Merry Tucker is a 69 y.o. female who is here today in Neurology for PD    Last visit  4/10/24 w me-start C;L.    History of Present Illness  The patient presents for evaluation of multiple medical concerns.      PD  The patient frequently forgets to take her afternoon dose of carbidopa-levodopa. Initially had great results but now feels it is less effective. Complains of worsening dry mouth.     She inquires about the possibility of discontinuing Lexapro  or BuSpar concurrently. She reports slurred speech, which she attributes to dry mouth, and difficulty swallowing. Her gait has slightly improved, and she denies any falls since her last visit. She denies experiencing nightmares or hallucinations. Her dry mouth predates the initiation of carbidopa-levodopa, but it has since worsened. She experiences tremors when attempting to perform tasks such as makeup or nervousness. A friend's son, a physical therapist, recommended a movement disorder specialist. She engages in personal training sessions twice a week, focusing on balance. She reports difficulty rolling over in bed at night, necessitating a sit-up during the night to regain motivation to move over.     PH    noticed small writing. Last year noticed tremor bilat hands when trying to do something. No temor at rest. Trouble putting on makeup.   Slurred speech, has trouble annunciating her words. No dysphagia. Has trouble turning over in bed. Has trouble rising from chair. Feels she is generally slower with bradykinesia. Has disturbing dreams of late  or can't find her car. No hallucinations.     She is taking compounded semiglutide from a pharmacy on line from [place] w increased constipation.            Balance Problem  Gait is improved on C/L.  Has trouble changing position while walking. Takes many little steps to turn direction of steps.  Has trouble initiating step. Has had multiple falls. Usually falls forward but  last time fell backward while taking off shoes.   Fell in closet and fractured wrist.        Labs from PCP reviewed.     PMH: HTN, HLD, GERD, hypothyroidism, insomnia, OA, anxiety,  HUBER Osteopenia, chronic neck pain.  FH:-PD. Mother with lung cancer. Father w prostate cancer. Brother  from OD. Children are alive and well  SH: +caffeine, +etoh 2-3/week, -drug. 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  Subjective      Past Medical History:   Past Medical History:   Diagnosis Date    Anxiety     Arthritis     Cancer     squamous skin cancer removed     Cataracts, bilateral     Difficulty walking     Diverticulitis     High cholesterol     Hypertension     Hypothyroid     IBS (irritable bowel syndrome)     Movement disorder     PONV (postoperative nausea and vomiting)     Sleep apnea     Stress incontinence     Wears glasses        Past Surgical History:   Past Surgical History:   Procedure Laterality Date    ANKLE OPEN REDUCTION INTERNAL FIXATION Right     orif    ANKLE OPEN REDUCTION INTERNAL FIXATION Left 2019    Procedure: ANKLE OPEN REDUCTION INTERNAL FIXATION LEFT;  Surgeon: Ole Dominguez MD;  Location:  [facility] OR;  Service: Orthopedics    AUGMENTATION MAMMAPLASTY Bilateral     silicone     COLONOSCOPY      2017    COSMETIC SURGERY      tummy tuck and facelift    HARDWARE REMOVAL Left 2019    Procedure: ANKLE/FOOT HARDWARE REMOVAL;  Surgeon: Ole Dominguez MD;  Location:  [facility] OR;  Service: Orthopedics       Family History:   Family History   Problem Relation Age of Onset    Cancer Mother     Hypertension Mother     Cancer Father     Hypertension Father     Heart attack Father     Breast cancer Maternal Aunt         DX AGE UNKNOWN     Ovarian cancer Neg Hx        Social History:   Social History     Socioeconomic History    Marital status:    Tobacco Use    Smoking status: Never    Smokeless tobacco: Never   Substance and Sexual Activity    Alcohol use: Yes     Alcohol/week: 2.0 standard drinks of alcohol     Types: 2 Shots of liquor per week     Comment: social    Drug use: No    Sexual activity: Not Currently     Partners: Male     Birth control/protection: Post-menopausal       Medications:     Current Outpatient Medications:     ascorbic acid (VITAMIN C) 1000 MG tablet, Take 1 tablet by mouth Daily., Disp: , Rfl:     busPIRone (BUSPAR) 10 MG tablet, Take 1 tablet by mouth 2 (Two) Times a Day., Disp: , Rfl:     CALCIUM CITRATE-VITAMIN D PO, Take 1,000 mg by mouth., Disp: , Rfl:     escitalopram (LEXAPRO) 10 MG tablet, Take 1 tablet by mouth Daily., Disp: , Rfl:     escitalopram (LEXAPRO) 20 MG tablet, Take 1 tablet by mouth every night at bedtime., Disp: , Rfl:     levothyroxine (SYNTHROID, LEVOTHROID) 75 MCG tablet, , Disp: , Rfl:     Linzess 145 MCG capsule capsule, Take 1 capsule by mouth Every Morning Before Breakfast., Disp: , Rfl:     meloxicam (MOBIC) 15 MG tablet, Take 1 tablet by mouth As Needed., Disp: , Rfl:     omeprazole (priLOSEC) 40 MG capsule, Take 1 capsule by mouth Every Other Day., Disp: , Rfl:     pravastatin (PRAVACHOL) 20 MG tablet, Take 1 tablet by mouth every night at bedtime., Disp: , Rfl: 1    zolpidem (AMBIEN) 5 MG tablet, Take 1 tablet by mouth every night at bedtime., Disp: , Rfl:     carbidopa-levodopa (Sinemet)  MG per tablet, Take 1 tablet by mouth 2 (Two) Times a Day., Disp: 60 tablet, Rfl: 5    Allergies:   Allergies   Allergen Reactions    Atorvastatin Myalgia     myalgias    Lisinopril Cough     Dry cough    Penicillins Rash     Has pedro luis. Keflex in the past       PHQ-9 Total Score:     STEADI Fall Risk Assessment was completed, and patient is at HIGH risk for falls. Assessment completed  on:7/15/2024    Objective     Physical Exam:   Physical Exam  Eyes:      Pupils: Pupils are equal, round, and reactive to light.   Neurological:      Mental Status: She is oriented to person, place, and time.      Coordination: Finger-Nose-Finger Test abnormal. Heel to Ramos Test normal.      Gait: Gait is intact.      Deep Tendon Reflexes:      Reflex Scores:       Tricep reflexes are 2+ on the right side and 2+ on the left side.       Bicep reflexes are 2+ on the right side and 2+ on the left side.       Brachioradialis reflexes are 2+ on the right side and 2+ on the left side.       Patellar reflexes are 2+ on the right side and 2+ on the left side.       Achilles reflexes are 2+ on the right side and 2+ on the left side.  Psychiatric:         Speech: Speech normal.         Neurologic Exam     Mental Status   Oriented to person, place, and time.   Follows 3 step commands.   Attention: normal. Concentration: normal.   Speech: speech is normal   Level of consciousness: alert  Knowledge: consistent with education.   Normal comprehension.     Cranial Nerves     CN III, IV, VI   Pupils are equal, round, and reactive to light.  Right pupil: Accommodation: intact.   Left pupil: Accommodation: intact.   CN III: no CN III palsy  CN VI: no CN VI palsy  Nystagmus: none   Diplopia: none  Upgaze: normal  Downgaze: normal  Conjugate gaze: present    CN VII   Facial expression full, symmetric.     CN VIII   Hearing: intact    CN XII   CN XII normal.     Motor Exam   Muscle bulk: normal  Overall muscle tone: normal    Strength   Right biceps: 5/5  Left biceps: 5/5  Right triceps: 5/5  Left triceps: 5/5  Right interossei: 5/5  Left interossei: 5/5  Right quadriceps: 5/5  Left quadriceps: 5/5  Right anterior tibial: 5/5  Left anterior tibial: 5/5  Right posterior tibial: 5/5  Left posterior tibial: 5/5    Sensory Exam   Light touch normal.     Gait, Coordination, and Reflexes     Gait  Gait: normal    Coordination   Finger to nose  "coordination: abnormal  Heel to shin coordination: normal    Tremor   Resting tremor: absent  Action tremor: left arm and right arm    Reflexes   Right brachioradialis: 2+  Left brachioradialis: 2+  Right biceps: 2+  Left biceps: 2+  Right triceps: 2+  Left triceps: 2+  Right patellar: 2+  Left patellar: 2+  Right achilles: 2+  Left achilles: 2+  Right : 2+  Left : 2+     Physical Exam        Vital Signs:   Vitals:    07/15/24 1118   BP: 114/76   Pulse: 84   SpO2: 98%   Weight: 64.4 kg (142 lb)   Height: 157.5 cm (62.01\")     Body mass index is 25.97 kg/m².         Assessment / Plan      Assessment/Plan:   Diagnoses and all orders for this visit:    1. Parkinson's disease, unspecified whether dyskinesia present, unspecified whether manifestations fluctuate (Primary)  Comments:  Inc dose of C/L 25/100 1 bid.  Orders:  -     carbidopa-levodopa (Sinemet)  MG per tablet; Take 1 tablet by mouth 2 (Two) Times a Day.  Dispense: 60 tablet; Refill: 5       Assessment & Plan  1. Parkinsonian tremor.  The patient's current medication regimen includes carbidopa-levodopa. The dosage of carbidopa-levodopa will be increased to 25/100 mg, to be taken twice daily for a duration of 2 weeks. Should the patient feel the need for a third dose, she is to inform me.  Discussed referral to [facility] not indicated at this time.      Patient Education:       Reviewed medications, potential side effects and signs and symptoms to report. Discussed risk versus benefits of treatment plan with patient and/or family-including medications, labs and radiology that may be ordered. Addressed questions and concerns during visit. Patient and/or family verbalized understanding and agree with plan. Instructed to call the office with any questions and report to ER with any life-threatening symptoms.     Follow Up:   Return in about 3 months (around 10/15/2024) for Recheck.    During this visit the following were done:  Labs Reviewed [x]    Labs " Ordered []    Radiology Reports Reviewed []    Radiology Ordered []    PCP Records Reviewed []    Referring Provider Records Reviewed []    ER Records Reviewed []    Hospital Records Reviewed []    History Obtained From Family []    Radiology Images Reviewed []    Other Reviewed [x]    Records Requested []      Patient or patient representative verbalized consent for the use of Ambient Listening during the visit with  Kourtney Lynn DNP, APRN for chart documentation. 7/15/2024  11:33 EDT      Kourtney Lynn DNP, APRN

## 2024-08-02 ENCOUNTER — TELEPHONE (OUTPATIENT)
Dept: NEUROLOGY | Facility: CLINIC | Age: 69
End: 2024-08-02
Payer: MEDICARE

## 2024-08-02 DIAGNOSIS — G20.A1 PARKINSON'S DISEASE, UNSPECIFIED WHETHER DYSKINESIA PRESENT, UNSPECIFIED WHETHER MANIFESTATIONS FLUCTUATE: Primary | ICD-10-CM

## 2024-08-02 NOTE — TELEPHONE ENCOUNTER
Caller: Merry Tucker    Relationship: Self    Best call back number: [phone]    Which medication are you concerned about: CARBIDOPA-LEVODOPA    Who prescribed you this medication: EMORY CARSON     When did you start taking this medication: 2 WEEKS AGO IT WAS INCREASED    What are your concerns: PATIENT WOULD LIKE TO RETURN TO THE LOWER DOSE OF  BECAUSE THIS DOSE IS MAKING HER SHAKE AND SHE DOESN'T LIKE THAT.    How long have you had these concerns: ABOUT A WEEK

## 2024-09-29 ENCOUNTER — APPOINTMENT (OUTPATIENT)
Facility: HOSPITAL | Age: 69
End: 2024-09-29
Payer: MEDICARE

## 2024-09-29 ENCOUNTER — HOSPITAL ENCOUNTER (EMERGENCY)
Facility: HOSPITAL | Age: 69
Discharge: HOME | End: 2024-09-29
Attending: EMERGENCY MEDICINE
Payer: MEDICARE

## 2024-09-29 VITALS
WEIGHT: 140 LBS | DIASTOLIC BLOOD PRESSURE: 87 MMHG | OXYGEN SATURATION: 96 % | SYSTOLIC BLOOD PRESSURE: 143 MMHG | RESPIRATION RATE: 16 BRPM | HEIGHT: 62 IN | TEMPERATURE: 98 F | HEART RATE: 80 BPM | BODY MASS INDEX: 25.76 KG/M2

## 2024-09-29 DIAGNOSIS — S20.212A CHEST WALL CONTUSION, LEFT, INITIAL ENCOUNTER: Primary | ICD-10-CM

## 2024-09-29 PROCEDURE — 99283 EMERGENCY DEPT VISIT LOW MDM: CPT

## 2024-09-29 PROCEDURE — 71045 X-RAY EXAM CHEST 1 VIEW: CPT

## 2024-10-05 NOTE — FSED PROVIDER NOTE
Subjective  History of Present Illness:    Patient presents with left shoulder injury and mechanical fall prior to arrival hit her left shoulder on a table denies any LOC trauma to the head or other injury no numbness tingling weakness anticoagulation use      Nurses Notes reviewed and agree, including vitals, allergies, social history and prior medical history.     REVIEW OF SYSTEMS: All systems reviewed and not pertinent unless noted.  Review of Systems    Past Medical History:   Diagnosis Date    Anxiety     Arthritis     Cancer     squamous skin cancer removed     Cataracts, bilateral     Difficulty walking     Diverticulitis     High cholesterol     Hypertension     Hypothyroid     IBS (irritable bowel syndrome)     Movement disorder     PONV (postoperative nausea and vomiting)     Sleep apnea     Stress incontinence     Wears glasses        Allergies:    Atorvastatin, Lisinopril, and Penicillins      Past Surgical History:   Procedure Laterality Date    ANKLE OPEN REDUCTION INTERNAL FIXATION Right 2003    orif    ANKLE OPEN REDUCTION INTERNAL FIXATION Left 03/20/2019    Procedure: ANKLE OPEN REDUCTION INTERNAL FIXATION LEFT;  Surgeon: Ole Dominguez MD;  Location:  [facility];  Service: Orthopedics    AUGMENTATION MAMMAPLASTY Bilateral 1987    silicone     COLONOSCOPY      2017    COSMETIC SURGERY      tummy tuck and facelift    HARDWARE REMOVAL Left 06/24/2019    Procedure: ANKLE/FOOT HARDWARE REMOVAL;  Surgeon: Ole Dominguez MD;  Location:  [facility];  Service: Orthopedics         Social History     Socioeconomic History    Marital status:    Tobacco Use    Smoking status: Never    Smokeless tobacco: Never   Substance and Sexual Activity    Alcohol use: Yes     Alcohol/week: 2.0 standard drinks of alcohol     Types: 2 Shots of liquor per week     Comment: social    Drug use: No    Sexual activity: Not Currently     Partners: Male     Birth control/protection: Post-menopausal         Family  "History   Problem Relation Age of Onset    Cancer Mother     Hypertension Mother     Cancer Father     Hypertension Father     Heart attack Father     Breast cancer Maternal Aunt         DX AGE UNKNOWN    Ovarian cancer Neg Hx        Objective  Physical Exam:  /87   Pulse 80   Temp 98 °F (36.7 °C) (Oral)   Resp 16   Ht 157.5 cm (62\")   Wt 63.5 kg (140 lb)   SpO2 96%   BMI 25.61 kg/m²      Physical Exam  Vitals and nursing note reviewed.   Constitutional:       General: She is not in acute distress.     Appearance: Normal appearance. She is not ill-appearing, toxic-appearing or diaphoretic.   Musculoskeletal:      Comments: Patient has tenderness to the left anterior shoulder full range of motion no obvious deformity   Neurological:      Mental Status: She is alert.   Psychiatric:         Mood and Affect: Mood normal.         Behavior: Behavior normal.         Procedures    ED Course:         Lab Results (last 24 hours)       ** No results found for the last 24 hours. **             No radiology results from the last 24 hrs       MDM      Patient's presenting with mechanical fall otherwise well-appearing isolated trauma left shoulder x-ray initially read interpreted myself showing no acute osseous abnormality patient discharged return precautions    Data interpreted: Nursing notes reviewed, vital signs reviewed.  Labs independently interpreted by me (CBC, CMP, lipase, UA, troponin, ABG, lactic acid, procalcitonin).  Imaging independently interpreted by me (x-ray, CT scan).  EKG independently interpreted by me.  O2 saturation:    Counseling: Discussed the results above with the patient regarding need for admission or discharge.  Patient understands and agrees plan of care.      -----  ED Disposition       ED Disposition   Discharge    Condition   Stable    Comment   --             Final diagnoses:   Chest wall contusion, left, initial encounter      Your Follow-Up Providers       Edmund Bello MD. "    Specialty: Internal Medicine  Follow up details: If symptoms worsen  [address]  [address]  [facility] [id number]  [phone]                       Contact information for after-discharge care    Follow-up information has not been specified.                    Your medication list        CONTINUE taking these medications        Instructions Last Dose Given Next Dose Due   ascorbic acid 1000 MG tablet  Commonly known as: VITAMIN C      Take 1 tablet by mouth Daily.       busPIRone 10 MG tablet  Commonly known as: BUSPAR      Take 1 tablet by mouth 2 (Two) Times a Day.       CALCIUM CITRATE-VITAMIN D PO      Take 1,000 mg by mouth.       carbidopa-levodopa  MG per tablet  Commonly known as: Sinemet      Take 1 tablet by mouth 3 (Three) Times a Day.       escitalopram 20 MG tablet  Commonly known as: LEXAPRO      Take 1 tablet by mouth every night at bedtime.       escitalopram 10 MG tablet  Commonly known as: LEXAPRO      Take 1 tablet by mouth Daily.       levothyroxine 75 MCG tablet  Commonly known as: SYNTHROID, LEVOTHROID           Linzess 145 MCG capsule capsule  Generic drug: linaclotide      Take 1 capsule by mouth Every Morning Before Breakfast.       meloxicam 15 MG tablet  Commonly known as: MOBIC      Take 1 tablet by mouth As Needed.       omeprazole 40 MG capsule  Commonly known as: priLOSEC      Take 1 capsule by mouth Every Other Day.       pravastatin 20 MG tablet  Commonly known as: PRAVACHOL      Take 1 tablet by mouth every night at bedtime.       zolpidem 5 MG tablet  Commonly known as: AMBIEN      Take 1 tablet by mouth every night at bedtime.

## 2024-10-15 ENCOUNTER — OFFICE VISIT (OUTPATIENT)
Dept: NEUROLOGY | Facility: CLINIC | Age: 69
End: 2024-10-15
Payer: MEDICARE

## 2024-10-15 VITALS
BODY MASS INDEX: 25.91 KG/M2 | OXYGEN SATURATION: 98 % | DIASTOLIC BLOOD PRESSURE: 80 MMHG | HEIGHT: 62 IN | WEIGHT: 140.8 LBS | SYSTOLIC BLOOD PRESSURE: 114 MMHG | HEART RATE: 80 BPM

## 2024-10-15 DIAGNOSIS — G25.81 RESTLESS LEGS SYNDROME (RLS): ICD-10-CM

## 2024-10-15 DIAGNOSIS — G20.A1 PARKINSON'S DISEASE, UNSPECIFIED WHETHER DYSKINESIA PRESENT, UNSPECIFIED WHETHER MANIFESTATIONS FLUCTUATE: Primary | ICD-10-CM

## 2024-10-15 PROCEDURE — 1159F MED LIST DOCD IN RCRD: CPT | Performed by: NURSE PRACTITIONER

## 2024-10-15 PROCEDURE — 3074F SYST BP LT 130 MM HG: CPT | Performed by: NURSE PRACTITIONER

## 2024-10-15 PROCEDURE — 1160F RVW MEDS BY RX/DR IN RCRD: CPT | Performed by: NURSE PRACTITIONER

## 2024-10-15 PROCEDURE — 3079F DIAST BP 80-89 MM HG: CPT | Performed by: NURSE PRACTITIONER

## 2024-10-15 PROCEDURE — 99214 OFFICE O/P EST MOD 30 MIN: CPT | Performed by: NURSE PRACTITIONER

## 2024-10-15 NOTE — PROGRESS NOTES
Neuro Office Visit      Encounter Date: 10/15/2024   Patient Name: Merry Tucker  : 1955   MRN: [id number]   PCP: Dr Bello  Chief Complaint:    Chief Complaint   Patient presents with    Parkinson's Disease       History of Present Illness: Merry Tucker is a 69 y.o. female who is here today in Neurology for  Parkinson's disease.    Last visit 7/15/2024 w me- inc dose of C/L 25/100 1 bid  Pt could not tolerate inc dose. Now taking 10/100 1 tid.  History of Present Illness       PD  Taking C/L 10/100 1 TID. Symptoms are stable and not progressive at this time.  The patient frequently forgets to take her afternoon dose of carbidopa-levodopa.  Complains of worsening dry mouth.      She reports slurred speech, which she attributes to dry mouth, and difficulty swallowing. Her gait has slightly improved, and she denies any falls since her last visit. She denies experiencing nightmares or hallucinations. Her dry mouth predates the initiation of carbidopa-levodopa, but it has since worsened. She experiences tremors when attempting to perform tasks such as makeup or nervousness. A friend's son, a physical therapist, recommended a movement disorder specialist. She engages in personal training sessions twice a week, focusing on balance. She reports difficulty rolling over in bed at night, necessitating a sit-up during the night to regain motivation to move over.     PH    noticed small writing. Last year noticed tremor bilat hands when trying to do something. No temor at rest. Trouble putting on makeup.   Slurred speech, has trouble annunciating her words. No dysphagia. Has trouble turning over in bed. Has trouble rising from chair. Feels she is generally slower with bradykinesia. Has disturbing dreams of late  or can't find her car. No hallucinations.     She is taking compounded semiglutide from a pharmacy on line from [place] w increased constipation.           Balance Problem  Gait is improved on  C/L.  Has trouble changing position while walking. Takes many little steps to turn direction of steps.  Has trouble initiating step. Has had multiple falls. Usually falls forward but  last time fell backward while taking off shoes.   Fell in closet and fractured wrist.    RLS  Complains of RLS sx at night. Taking tylenol and if that is not effective taking 1/2 CBD gummie which allows her to sleep. Discussed using requip. She declined.        Labs from PCP reviewed.     PMH: HTN, HLD, GERD, hypothyroidism, insomnia, OA, anxiety,  HUBER Osteopenia, chronic neck pain.  FH:-PD. Mother with lung cancer. Father w prostate cancer. Brother  from OD. Children are alive and well  SH: +caffeine, +etoh 2-3/week, -drug. 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  Subjective      Past Medical History:   Past Medical History:   Diagnosis Date    Anxiety     Arthritis     Cancer     squamous skin cancer removed     Cataracts, bilateral     Difficulty walking     Diverticulitis     High cholesterol     Hypertension     Hypothyroid     IBS (irritable bowel syndrome)     Movement disorder     PONV (postoperative nausea and vomiting)     Sleep apnea     Stress incontinence     Wears glasses        Past Surgical History:   Past Surgical History:   Procedure Laterality Date    ANKLE OPEN REDUCTION INTERNAL FIXATION Right     orif    ANKLE OPEN REDUCTION INTERNAL FIXATION Left 2019    Procedure: ANKLE OPEN REDUCTION INTERNAL FIXATION LEFT;  Surgeon: Ole Dominguez MD;  Location:  [facility];  Service: Orthopedics    AUGMENTATION MAMMAPLASTY Bilateral     silicone     COLONOSCOPY      2017    COSMETIC SURGERY      tummy tuck and facelift    HARDWARE REMOVAL Left 2019    Procedure: ANKLE/FOOT HARDWARE REMOVAL;  Surgeon: Ole Dominguez MD;  Location:  [facility] OR;  Service: Orthopedics       Family History:   Family History   Problem Relation Age of Onset    Cancer Mother     Hypertension Mother      Cancer Father     Hypertension Father     Heart attack Father     Breast cancer Maternal Aunt         DX AGE UNKNOWN    Ovarian cancer Neg Hx        Social History:   Social History     Socioeconomic History    Marital status:    Tobacco Use    Smoking status: Never    Smokeless tobacco: Never   Substance and Sexual Activity    Alcohol use: Yes     Alcohol/week: 2.0 standard drinks of alcohol     Types: 2 Drinks containing 0.5 oz of alcohol per week     Comment: social    Drug use: No    Sexual activity: Not Currently     Partners: Male     Birth control/protection: Post-menopausal       Medications:     Current Outpatient Medications:     ascorbic acid (VITAMIN C) 1000 MG tablet, Take 1 tablet by mouth Daily., Disp: , Rfl:     CALCIUM CITRATE-VITAMIN D PO, Take 1,000 mg by mouth., Disp: , Rfl:     carbidopa-levodopa (Sinemet)  MG per tablet, Take 1 tablet by mouth 3 (Three) Times a Day., Disp: 90 tablet, Rfl: 2    escitalopram (LEXAPRO) 20 MG tablet, Take 1 tablet by mouth every night at bedtime., Disp: , Rfl:     levothyroxine (SYNTHROID, LEVOTHROID) 75 MCG tablet, , Disp: , Rfl:     Linzess 145 MCG capsule capsule, Take 1 capsule by mouth Every Morning Before Breakfast., Disp: , Rfl:     meloxicam (MOBIC) 15 MG tablet, Take 1 tablet by mouth As Needed., Disp: , Rfl:     Multiple Vitamins-Minerals (MULTIVITAMIN GUMMIES ADULT PO), Take  by mouth., Disp: , Rfl:     omeprazole (priLOSEC) 40 MG capsule, Take 1 capsule by mouth Every Other Day., Disp: , Rfl:     pravastatin (PRAVACHOL) 20 MG tablet, Take 1 tablet by mouth every night at bedtime., Disp: , Rfl: 1    zolpidem (AMBIEN) 5 MG tablet, Take 1 tablet by mouth every night at bedtime., Disp: , Rfl:     Allergies:   Allergies   Allergen Reactions    Atorvastatin Myalgia     myalgias    Lisinopril Cough     Dry cough    Penicillins Rash     Has pedro luis. Keflex in the past       PHQ-9 Total Score:     STEADI Fall Risk Assessment was completed, and  patient is at HIGH risk for falls. Assessment completed on:10/15/2024    Objective     Physical Exam:   Physical Exam  Eyes:      Extraocular Movements: No nystagmus.   Neurological:      Motor: Motor strength is normal.     Coordination: Coordination is intact.      Deep Tendon Reflexes:      Reflex Scores:       Bicep reflexes are 2+ on the right side and 2+ on the left side.       Brachioradialis reflexes are 2+ on the right side and 2+ on the left side.       Patellar reflexes are 2+ on the right side and 2+ on the left side.       Achilles reflexes are 2+ on the right side and 2+ on the left side.  Psychiatric:         Speech: Speech normal.         Neurological Exam  Mental Status  Awake, alert and oriented to person, place and time. Recent and remote memory are intact. Speech is normal. Follows complex commands. Attention and concentration are normal. Fund of knowledge is appropriate for level of education.    Cranial Nerves  CN III, IV, VI: No nystagmus. Normal saccades. Normal smooth pursuit.   Right pupil: Round.   Left pupil: Round.  CN V: Facial sensation is normal.  CN VII: Full and symmetric facial movement.    Motor  Normal muscle bulk throughout. No fasciculations present. Normal muscle tone. No abnormal involuntary movements. Strength is 5/5 throughout all four extremities.    Sensory  Sensation is intact to light touch, pinprick, vibration and proprioception in all four extremities.    Reflexes                                            Right                      Left  Brachioradialis                    2+                         2+  Biceps                                 2+                         2+  Patellar                                2+                         2+  Achilles                                2+                         2+    Coordination    Finger-to-nose, rapid alternating movements and heel-to-shin normal bilaterally without dysmetria.    Gait  Normal casual, toe, heel and  "tandem gait.     Physical Exam        Vital Signs:   Vitals:    10/15/24 1100   BP: 114/80   Pulse: 80   SpO2: 98%   Weight: 63.9 kg (140 lb 12.8 oz)   Height: 157.5 cm (62.01\")     Body mass index is 25.75 kg/m².         Assessment / Plan      Assessment/Plan:   Diagnoses and all orders for this visit:    1. Parkinson's disease, unspecified whether dyskinesia present, unspecified whether manifestations fluctuate (Primary)  Comments:  C/L 10/100 1 tid    2. Restless legs syndrome (RLS)       Assessment & Plan          Patient Education:       Reviewed medications, potential side effects and signs and symptoms to report. Discussed risk versus benefits of treatment plan with patient and/or family-including medications, labs and radiology that may be ordered. Addressed questions and concerns during visit. Patient and/or family verbalized understanding and agree with plan. Instructed to call the office with any questions and report to ER with any life-threatening symptoms.     Follow Up:   Return in about 6 months (around 4/15/2025) for Recheck.    During this visit the following were done:  Labs Reviewed [x]    Labs Ordered []    Radiology Reports Reviewed []    Radiology Ordered []    PCP Records Reviewed []    Referring Provider Records Reviewed []    ER Records Reviewed []    Hospital Records Reviewed []    History Obtained From Family []    Radiology Images Reviewed []    Other Reviewed [x]    Records Requested []      Patient or patient representative verbalized consent for the use of Ambient Listening during the visit with  Kourtney Lynn DNP, EMORY for chart documentation. 10/15/2024  08:34 EDT      Kourtney Lynn DNP, APRDYLON  "

## 2024-11-05 ENCOUNTER — TRANSCRIBE ORDERS (OUTPATIENT)
Dept: ADMINISTRATIVE | Facility: HOSPITAL | Age: 69
End: 2024-11-05
Payer: MEDICARE

## 2024-11-05 DIAGNOSIS — Z12.31 VISIT FOR SCREENING MAMMOGRAM: Primary | ICD-10-CM

## 2024-11-14 ENCOUNTER — TELEPHONE (OUTPATIENT)
Dept: NEUROLOGY | Facility: CLINIC | Age: 69
End: 2024-11-14
Payer: MEDICARE

## 2024-11-14 DIAGNOSIS — G20.A1 PARKINSON'S DISEASE, UNSPECIFIED WHETHER DYSKINESIA PRESENT, UNSPECIFIED WHETHER MANIFESTATIONS FLUCTUATE: ICD-10-CM

## 2024-11-14 DIAGNOSIS — G25.81 RESTLESS LEGS SYNDROME (RLS): Primary | ICD-10-CM

## 2024-11-14 RX ORDER — ROPINIROLE 0.25 MG/1
0.25 TABLET, FILM COATED ORAL NIGHTLY
Qty: 90 TABLET | Refills: 2 | Status: SHIPPED | OUTPATIENT
Start: 2024-11-14 | End: 2025-11-14

## 2024-11-14 RX ORDER — CARBIDOPA/LEVODOPA 10MG-100MG
1 TABLET ORAL 3 TIMES DAILY
Qty: 270 TABLET | Refills: 3 | Status: SHIPPED | OUTPATIENT
Start: 2024-11-14

## 2024-11-14 NOTE — TELEPHONE ENCOUNTER
Rx Refill Note  Requested Prescriptions     Pending Prescriptions Disp Refills    carbidopa-levodopa (SINEMET)  MG per tablet [Pharmacy Med Name: Carbidopa-Levodopa Oral Tablet  MG] 90 tablet 0     Sig: TAKE 1 TABLET BY MOUTH 3 TIMES A DAY      Last filled: 8/2/24 90 with 2 refills.  Last office visit with prescribing clinician: 10/15/2024      Next office visit with prescribing clinician: 4/15/2025     Sent in 270 with 3 refills.    Maile Lance MA  11/14/24, 13:29 EST

## 2024-11-14 NOTE — TELEPHONE ENCOUNTER
Patient called stating she would like a script sent in for Ropinirole as she is not getting enough relief from current medication.

## 2024-11-18 LAB
NCCN CRITERIA FLAG: ABNORMAL
TYRER CUZICK SCORE: 5.5

## 2024-12-03 ENCOUNTER — HOSPITAL ENCOUNTER (OUTPATIENT)
Dept: MAMMOGRAPHY | Facility: HOSPITAL | Age: 69
Discharge: HOME | End: 2024-12-03
Admitting: INTERNAL MEDICINE
Payer: MEDICARE

## 2024-12-03 DIAGNOSIS — Z12.31 VISIT FOR SCREENING MAMMOGRAM: ICD-10-CM

## 2024-12-03 PROCEDURE — 77067 SCR MAMMO BI INCL CAD: CPT

## 2024-12-03 PROCEDURE — 77063 BREAST TOMOSYNTHESIS BI: CPT

## 2025-04-15 ENCOUNTER — OFFICE VISIT (OUTPATIENT)
Dept: NEUROLOGY | Facility: CLINIC | Age: 70
End: 2025-04-15
Payer: MEDICARE

## 2025-04-15 ENCOUNTER — LAB (OUTPATIENT)
Dept: LAB | Facility: HOSPITAL | Age: 70
End: 2025-04-15
Payer: MEDICARE

## 2025-04-15 VITALS
DIASTOLIC BLOOD PRESSURE: 82 MMHG | RESPIRATION RATE: 16 BRPM | SYSTOLIC BLOOD PRESSURE: 120 MMHG | BODY MASS INDEX: 25.69 KG/M2 | HEART RATE: 71 BPM | TEMPERATURE: 96.8 F | HEIGHT: 62 IN | WEIGHT: 139.6 LBS | OXYGEN SATURATION: 98 %

## 2025-04-15 DIAGNOSIS — H02.401 PTOSIS OF RIGHT EYELID: ICD-10-CM

## 2025-04-15 DIAGNOSIS — H02.401 PTOSIS OF RIGHT EYELID: Primary | ICD-10-CM

## 2025-04-15 DIAGNOSIS — G20.A1 PARKINSON'S DISEASE, UNSPECIFIED WHETHER DYSKINESIA PRESENT, UNSPECIFIED WHETHER MANIFESTATIONS FLUCTUATE: ICD-10-CM

## 2025-04-15 PROCEDURE — 86041 ACETYLCHOLN RCPTR BNDNG ANTB: CPT

## 2025-04-15 PROCEDURE — 86043 ACETYLCHOLN RCPTR MODLG ANTB: CPT

## 2025-04-15 PROCEDURE — 1159F MED LIST DOCD IN RCRD: CPT | Performed by: NURSE PRACTITIONER

## 2025-04-15 PROCEDURE — 1160F RVW MEDS BY RX/DR IN RCRD: CPT | Performed by: NURSE PRACTITIONER

## 2025-04-15 PROCEDURE — 3079F DIAST BP 80-89 MM HG: CPT | Performed by: NURSE PRACTITIONER

## 2025-04-15 PROCEDURE — 3074F SYST BP LT 130 MM HG: CPT | Performed by: NURSE PRACTITIONER

## 2025-04-15 PROCEDURE — 86042 ACETYLCHOLN RCPTR BLCKG ANTB: CPT

## 2025-04-15 PROCEDURE — 99214 OFFICE O/P EST MOD 30 MIN: CPT | Performed by: NURSE PRACTITIONER

## 2025-04-15 PROCEDURE — 36415 COLL VENOUS BLD VENIPUNCTURE: CPT

## 2025-04-15 RX ORDER — MECLIZINE HYDROCHLORIDE 25 MG/1
TABLET ORAL
COMMUNITY
Start: 2025-03-06

## 2025-04-15 NOTE — LETTER
2025     Edmund Bello MD  [address] [facility] [address]  [facility] [address]    Patient: Merry Tucker   YOB: 1955   Date of Visit: 4/15/2025     Dear Edmund Bello MD:       Thank you for referring Merry Tucker to me for evaluation. Below are the relevant portions of my assessment and plan of care.    If you have questions, please do not hesitate to call me. I look forward to following Merry along with you.         Sincerely,        Kourtney Lynn DNP, APRDYLON        CC: No Recipients    Kourtney Lynn DNP, EMORY  25 1215  Signed     Neuro Office Visit      Encounter Date: 04/15/2025   Patient Name: Merry Tucker  : 1955   MRN: [id number]   PCP: Dr Bello  Chief Complaint:    Chief Complaint   Patient presents with   • Parkinson's Disease       History of Present Illness: Merry Tucker is a 70 y.o. female who is here today in Neurology for  PD    Last visit 10/15/2024-C/L 10/100 1 TID  History of Present Illness       PD  Taking C/L 10/100 1 TID. Symptoms are stable and not progressive at this time.  Complains of worsening dry mouth.      She reports slurred speech, which she attributes to dry mouth, and difficulty swallowing. Her gait has slightly improved, and she denies any falls since her last visit. She denies experiencing nightmares or hallucinations. Her dry mouth predates the initiation of carbidopa-levodopa, but it has since worsened. She experiences tremors when attempting to perform tasks such as makeup or nervousness. A friend's son, a physical therapist, recommended a movement disorder specialist. She engages in personal training sessions twice a week, focusing on balance. She reports difficulty rolling over in bed at night, necessitating a sit-up during the night to regain motivation to move over.     PH    noticed small writing. Last year noticed tremor bilat hands when trying to do something. No temor at rest. Trouble putting on  makeup.   Slurred speech, has trouble annunciating her words. No dysphagia. Has trouble turning over in bed. Has trouble rising from chair. Feels she is generally slower with bradykinesia. Has disturbing dreams of late  or can't find her car. No hallucinations.     She is taking compounded semiglutide from a pharmacy on line from [place] w increased constipation.           Balance Problem  Gait is improved on C/L.  Has trouble changing position while walking. Takes many little steps to turn direction of steps.  Has trouble initiating step. Has had multiple falls. Usually falls forward but  last time fell backward while taking off shoes.   Fell in closet and fractured wrist.     RLS  Complains of RLS sx at night. Taking tylenol and if that is not effective taking 1/2 CBD gummie which allows her to sleep. Discussed using requip. She declined.    Insomnia  Taking THC 5mg gummie w Ambien at night. Dreaming but not acting out her dreams.     Ptosis  Pt with OD ptosis. She thinks it is cosmetic. Has appt with Dr Shay at      Labs from PCP reviewed.     PMH: HTN, HLD, GERD, hypothyroidism, insomnia, OA, anxiety,  HUBER Osteopenia, chronic neck pain.  FH:-PD. Mother with lung cancer. Father w prostate cancer. Brother  from OD. Children are alive and well  SH: +caffeine, +etoh 2-3/week, -drug. 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  Subjective      Past Medical History:   Past Medical History:   Diagnosis Date   • Anxiety    • Arthritis    • Cancer     squamous skin cancer removed    • Cataracts, bilateral    • Difficulty walking    • Diverticulitis    • High cholesterol    • Hypertension    • Hypothyroid    • IBS (irritable bowel syndrome)    • Movement disorder    • PONV (postoperative nausea and vomiting)    • Sleep apnea    • Stress incontinence    • Vestibular neuritis    • Wears glasses        Past Surgical History:   Past Surgical History:   Procedure Laterality Date   • ANKLE OPEN  REDUCTION INTERNAL FIXATION Right 2003    orif   • ANKLE OPEN REDUCTION INTERNAL FIXATION Left 03/20/2019    Procedure: ANKLE OPEN REDUCTION INTERNAL FIXATION LEFT;  Surgeon: Ole Dominguez MD;  Location:  [facility];  Service: Orthopedics   • AUGMENTATION MAMMAPLASTY Bilateral 1987    silicone    • COLONOSCOPY      2017   • COSMETIC SURGERY      tummy tuck and facelift   • HARDWARE REMOVAL Left 06/24/2019    Procedure: ANKLE/FOOT HARDWARE REMOVAL;  Surgeon: Ole Dominguez MD;  Location:  [facility];  Service: Orthopedics       Family History:   Family History   Problem Relation Age of Onset   • Cancer Mother    • Hypertension Mother    • Cancer Father    • Hypertension Father    • Heart attack Father    • Breast cancer Maternal Aunt         DX AGE UNKNOWN   • Ovarian cancer Neg Hx        Social History:   Social History     Socioeconomic History   • Marital status:    Tobacco Use   • Smoking status: Never   • Smokeless tobacco: Never   Substance and Sexual Activity   • Alcohol use: Yes     Alcohol/week: 2.0 standard drinks of alcohol     Types: 2 Drinks containing 0.5 oz of alcohol per week     Comment: social   • Drug use: No   • Sexual activity: Not Currently     Partners: Male     Birth control/protection: Post-menopausal       Medications:     Current Outpatient Medications:   •  ascorbic acid (VITAMIN C) 1000 MG tablet, Take 1 tablet by mouth Daily., Disp: , Rfl:   •  CALCIUM CITRATE-VITAMIN D PO, Take 1,000 mg by mouth., Disp: , Rfl:   •  carbidopa-levodopa (SINEMET)  MG per tablet, TAKE 1 TABLET BY MOUTH 3 TIMES A DAY, Disp: 270 tablet, Rfl: 3  •  escitalopram (LEXAPRO) 20 MG tablet, Take 1 tablet by mouth every night at bedtime., Disp: , Rfl:   •  levothyroxine (SYNTHROID, LEVOTHROID) 75 MCG tablet, , Disp: , Rfl:   •  Linzess 145 MCG capsule capsule, Take 1 capsule by mouth Every Morning Before Breakfast., Disp: , Rfl:   •  meclizine (ANTIVERT) 25 MG tablet, TAKE 1 TABLET BY MOUTH 2-3  TIMES A DAY AS NEEDED FOR DIZZINESS, Disp: , Rfl:   •  meloxicam (MOBIC) 15 MG tablet, Take 1 tablet by mouth As Needed., Disp: , Rfl:   •  Multiple Vitamins-Minerals (MULTIVITAMIN GUMMIES ADULT PO), Take  by mouth., Disp: , Rfl:   •  omeprazole (priLOSEC) 40 MG capsule, Take 1 capsule by mouth Every Other Day., Disp: , Rfl:   •  pravastatin (PRAVACHOL) 20 MG tablet, Take 1 tablet by mouth every night at bedtime., Disp: , Rfl: 1  •  rOPINIRole (REQUIP) 0.25 MG tablet, Take 1 tablet by mouth Every Night. Take 1 hour before bedtime., Disp: 90 tablet, Rfl: 2  •  zolpidem (AMBIEN) 5 MG tablet, Take 1 tablet by mouth every night at bedtime., Disp: , Rfl:   •  neomycin-polymyxin-dexamethamethasone (POLYDEX) 3.5-18721-3.1 ointment ophthalmic ointment, Administer a 1 cm ribbon into the lower lid of the right eye 2 (Two) Times a Day., Disp: 3.5 g, Rfl: 0    Allergies:   Allergies   Allergen Reactions   • Atorvastatin Myalgia     myalgias   • Lisinopril Cough     Dry cough   • Penicillins Rash     Has pedro luis. Keflex in the past       PHQ-9 Total Score:     STEADI Fall Risk Assessment has not been completed.    Objective     Physical Exam:   Physical Exam  Eyes:      Extraocular Movements: No nystagmus.   Neurological:      Motor: Motor strength is normal.     Deep Tendon Reflexes:      Reflex Scores:       Bicep reflexes are 2+ on the right side and 2+ on the left side.       Brachioradialis reflexes are 2+ on the right side and 2+ on the left side.       Patellar reflexes are 2+ on the right side and 2+ on the left side.       Achilles reflexes are 2+ on the right side and 2+ on the left side.  Psychiatric:         Speech: Speech normal.         Neurological Exam  Mental Status  Awake, alert and oriented to person, place and time. Recent and remote memory are intact. Speech is normal. Follows complex commands. Attention and concentration are normal. Fund of knowledge is appropriate for level of education.    Cranial Nerves  CN  "III, IV, VI: No nystagmus. Normal saccades. Normal smooth pursuit.   Right pupil: Round.   Left pupil: Round.  CN V: Facial sensation is normal.  CN VII: Full and symmetric facial movement. Somewhat masked face.  Right lid ptosis.    Motor  Normal muscle bulk throughout. No fasciculations present. Normal muscle tone. No abnormal involuntary movements. Strength is 5/5 throughout all four extremities.    Sensory  Sensation is intact to light touch, pinprick, vibration and proprioception in all four extremities.    Reflexes                                            Right                      Left  Brachioradialis                    2+                         2+  Biceps                                 2+                         2+  Patellar                                2+                         2+  Achilles                                2+                         2+    Coordination  Right: Finger-to-nose normal.Left: Finger-to-nose normal.  Bradykinesia is generalized.    Gait  Casual gait:  Slow even gait with short stride.     Physical Exam        Vital Signs:   Vitals:    04/15/25 1054   BP: 120/82   BP Location: Left arm   Patient Position: Sitting   Cuff Size: Adult   Pulse: 71   Resp: 16   Temp: 96.8 °F (36 °C)   SpO2: 98%   Weight: 63.3 kg (139 lb 9.6 oz)   Height: 157.5 cm (62.01\")   PainSc: 5    PainLoc: Generalized     Body mass index is 25.53 kg/m².         Assessment / Plan      Assessment/Plan:   Diagnoses and all orders for this visit:    1. Ptosis of right eyelid (Primary)  Comments:  Check MG panel  Orders:  -     Acetylcholine Receptor Antibody Panel; Future    2. Parkinson's disease, unspecified whether dyskinesia present, unspecified whether manifestations fluctuate  Comments:  Cont 10/100 1 tid       Assessment & Plan          Patient Education:       Reviewed medications, potential side effects and signs and symptoms to report. Discussed risk versus benefits of treatment plan with patient " and/or family-including medications, labs and radiology that may be ordered. Addressed questions and concerns during visit. Patient and/or family verbalized understanding and agree with plan. Instructed to call the office with any questions and report to ER with any life-threatening symptoms.     Follow Up:   Return in about 6 months (around 10/15/2025) for Recheck.    During this visit the following were done:  Labs Reviewed []    Labs Ordered [x]    Radiology Reports Reviewed []    Radiology Ordered []    PCP Records Reviewed []    Referring Provider Records Reviewed []    ER Records Reviewed []    Hospital Records Reviewed []    History Obtained From Family []    Radiology Images Reviewed []    Other Reviewed [x]    Records Requested []      Patient or patient representative verbalized consent for the use of Ambient Listening during the visit with  Kourtney Lynn DNP, APRN for chart documentation. 4/16/2025  08:42 EDT      Kourtney Lynn DNP, APRN

## 2025-04-15 NOTE — PROGRESS NOTES
Neuro Office Visit      Encounter Date: 04/15/2025   Patient Name: Merry Tucker  : 1955   MRN: [id number]   PCP: Dr Bello  Chief Complaint:    Chief Complaint   Patient presents with    Parkinson's Disease       History of Present Illness: Merry Tucker is a 70 y.o. female who is here today in Neurology for  PD    Last visit 10/15/2024-C/L 10/100 1 TID  History of Present Illness       PD  Taking C/L 10/100 1 TID. Symptoms are stable and not progressive at this time.  Complains of worsening dry mouth.      She reports slurred speech, which she attributes to dry mouth, and difficulty swallowing. Her gait has slightly improved, and she denies any falls since her last visit. She denies experiencing nightmares or hallucinations. Her dry mouth predates the initiation of carbidopa-levodopa, but it has since worsened. She experiences tremors when attempting to perform tasks such as makeup or nervousness. A friend's son, a physical therapist, recommended a movement disorder specialist. She engages in personal training sessions twice a week, focusing on balance. She reports difficulty rolling over in bed at night, necessitating a sit-up during the night to regain motivation to move over.     PH    noticed small writing. Last year noticed tremor bilat hands when trying to do something. No temor at rest. Trouble putting on makeup.   Slurred speech, has trouble annunciating her words. No dysphagia. Has trouble turning over in bed. Has trouble rising from chair. Feels she is generally slower with bradykinesia. Has disturbing dreams of late  or can't find her car. No hallucinations.     She is taking compounded semiglutide from a pharmacy on line from [place] w increased constipation.           Balance Problem  Gait is improved on C/L.  Has trouble changing position while walking. Takes many little steps to turn direction of steps.  Has trouble initiating step. Has had multiple falls. Usually falls forward  but  last time fell backward while taking off shoes.   Fell in closet and fractured wrist.     RLS  Complains of RLS sx at night. Taking tylenol and if that is not effective taking 1/2 CBD gummie which allows her to sleep. Discussed using requip. She declined.    Insomnia  Taking THC 5mg gummie w Ambien at night. Dreaming but not acting out her dreams.     Ptosis  Pt with OD ptosis. She thinks it is cosmetic. Has appt with Dr Shay at      Labs from PCP reviewed.     PMH: HTN, HLD, GERD, hypothyroidism, insomnia, OA, anxiety,  HUBER Osteopenia, chronic neck pain.  FH:-PD. Mother with lung cancer. Father w prostate cancer. Brother  from OD. Children are alive and well  SH: +caffeine, +etoh 2-3/week, -drug. 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  Subjective      Past Medical History:   Past Medical History:   Diagnosis Date    Anxiety     Arthritis     Cancer     squamous skin cancer removed     Cataracts, bilateral     Difficulty walking     Diverticulitis     High cholesterol     Hypertension     Hypothyroid     IBS (irritable bowel syndrome)     Movement disorder     PONV (postoperative nausea and vomiting)     Sleep apnea     Stress incontinence     Vestibular neuritis     Wears glasses        Past Surgical History:   Past Surgical History:   Procedure Laterality Date    ANKLE OPEN REDUCTION INTERNAL FIXATION Right     orif    ANKLE OPEN REDUCTION INTERNAL FIXATION Left 2019    Procedure: ANKLE OPEN REDUCTION INTERNAL FIXATION LEFT;  Surgeon: Ole Dominguez MD;  Location:  [facility];  Service: Orthopedics    AUGMENTATION MAMMAPLASTY Bilateral     silicone     COLONOSCOPY      2017    COSMETIC SURGERY      tummy tuck and facelift    HARDWARE REMOVAL Left 2019    Procedure: ANKLE/FOOT HARDWARE REMOVAL;  Surgeon: Ole Dominguez MD;  Location:  [facility];  Service: Orthopedics       Family History:   Family History   Problem Relation Age of Onset    Cancer Mother      Hypertension Mother     Cancer Father     Hypertension Father     Heart attack Father     Breast cancer Maternal Aunt         DX AGE UNKNOWN    Ovarian cancer Neg Hx        Social History:   Social History     Socioeconomic History    Marital status:    Tobacco Use    Smoking status: Never    Smokeless tobacco: Never   Substance and Sexual Activity    Alcohol use: Yes     Alcohol/week: 2.0 standard drinks of alcohol     Types: 2 Drinks containing 0.5 oz of alcohol per week     Comment: social    Drug use: No    Sexual activity: Not Currently     Partners: Male     Birth control/protection: Post-menopausal       Medications:     Current Outpatient Medications:     ascorbic acid (VITAMIN C) 1000 MG tablet, Take 1 tablet by mouth Daily., Disp: , Rfl:     CALCIUM CITRATE-VITAMIN D PO, Take 1,000 mg by mouth., Disp: , Rfl:     carbidopa-levodopa (SINEMET)  MG per tablet, TAKE 1 TABLET BY MOUTH 3 TIMES A DAY, Disp: 270 tablet, Rfl: 3    escitalopram (LEXAPRO) 20 MG tablet, Take 1 tablet by mouth every night at bedtime., Disp: , Rfl:     levothyroxine (SYNTHROID, LEVOTHROID) 75 MCG tablet, , Disp: , Rfl:     Linzess 145 MCG capsule capsule, Take 1 capsule by mouth Every Morning Before Breakfast., Disp: , Rfl:     meclizine (ANTIVERT) 25 MG tablet, TAKE 1 TABLET BY MOUTH 2-3 TIMES A DAY AS NEEDED FOR DIZZINESS, Disp: , Rfl:     meloxicam (MOBIC) 15 MG tablet, Take 1 tablet by mouth As Needed., Disp: , Rfl:     Multiple Vitamins-Minerals (MULTIVITAMIN GUMMIES ADULT PO), Take  by mouth., Disp: , Rfl:     omeprazole (priLOSEC) 40 MG capsule, Take 1 capsule by mouth Every Other Day., Disp: , Rfl:     pravastatin (PRAVACHOL) 20 MG tablet, Take 1 tablet by mouth every night at bedtime., Disp: , Rfl: 1    rOPINIRole (REQUIP) 0.25 MG tablet, Take 1 tablet by mouth Every Night. Take 1 hour before bedtime., Disp: 90 tablet, Rfl: 2    zolpidem (AMBIEN) 5 MG tablet, Take 1 tablet by mouth every night at bedtime., Disp:  , Rfl:     neomycin-polymyxin-dexamethamethasone (POLYDEX) 3.5-90309-1.1 ointment ophthalmic ointment, Administer a 1 cm ribbon into the lower lid of the right eye 2 (Two) Times a Day., Disp: 3.5 g, Rfl: 0    Allergies:   Allergies   Allergen Reactions    Atorvastatin Myalgia     myalgias    Lisinopril Cough     Dry cough    Penicillins Rash     Has pedro luis. Keflex in the past       PHQ-9 Total Score:     STEADI Fall Risk Assessment has not been completed.    Objective     Physical Exam:   Physical Exam  Eyes:      Extraocular Movements: No nystagmus.   Neurological:      Motor: Motor strength is normal.     Deep Tendon Reflexes:      Reflex Scores:       Bicep reflexes are 2+ on the right side and 2+ on the left side.       Brachioradialis reflexes are 2+ on the right side and 2+ on the left side.       Patellar reflexes are 2+ on the right side and 2+ on the left side.       Achilles reflexes are 2+ on the right side and 2+ on the left side.  Psychiatric:         Speech: Speech normal.         Neurological Exam  Mental Status  Awake, alert and oriented to person, place and time. Recent and remote memory are intact. Speech is normal. Follows complex commands. Attention and concentration are normal. Fund of knowledge is appropriate for level of education.    Cranial Nerves  CN III, IV, VI: No nystagmus. Normal saccades. Normal smooth pursuit.   Right pupil: Round.   Left pupil: Round.  CN V: Facial sensation is normal.  CN VII: Full and symmetric facial movement. Somewhat masked face.  Right lid ptosis.    Motor  Normal muscle bulk throughout. No fasciculations present. Normal muscle tone. No abnormal involuntary movements. Strength is 5/5 throughout all four extremities.    Sensory  Sensation is intact to light touch, pinprick, vibration and proprioception in all four extremities.    Reflexes                                            Right                      Left  Brachioradialis                    2+                 "         2+  Biceps                                 2+                         2+  Patellar                                2+                         2+  Achilles                                2+                         2+    Coordination  Right: Finger-to-nose normal.Left: Finger-to-nose normal.  Bradykinesia is generalized.    Gait  Casual gait:  Slow even gait with short stride.     Physical Exam        Vital Signs:   Vitals:    04/15/25 1054   BP: 120/82   BP Location: Left arm   Patient Position: Sitting   Cuff Size: Adult   Pulse: 71   Resp: 16   Temp: 96.8 °F (36 °C)   SpO2: 98%   Weight: 63.3 kg (139 lb 9.6 oz)   Height: 157.5 cm (62.01\")   PainSc: 5    PainLoc: Generalized     Body mass index is 25.53 kg/m².         Assessment / Plan      Assessment/Plan:   Diagnoses and all orders for this visit:    1. Ptosis of right eyelid (Primary)  Comments:  Check MG panel  Orders:  -     Acetylcholine Receptor Antibody Panel; Future    2. Parkinson's disease, unspecified whether dyskinesia present, unspecified whether manifestations fluctuate  Comments:  Cont 10/100 1 tid       Assessment & Plan          Patient Education:       Reviewed medications, potential side effects and signs and symptoms to report. Discussed risk versus benefits of treatment plan with patient and/or family-including medications, labs and radiology that may be ordered. Addressed questions and concerns during visit. Patient and/or family verbalized understanding and agree with plan. Instructed to call the office with any questions and report to ER with any life-threatening symptoms.     Follow Up:   Return in about 6 months (around 10/15/2025) for Recheck.    During this visit the following were done:  Labs Reviewed []    Labs Ordered [x]    Radiology Reports Reviewed []    Radiology Ordered []    PCP Records Reviewed []    Referring Provider Records Reviewed []    ER Records Reviewed []    Hospital Records Reviewed []    History Obtained From " Family []    Radiology Images Reviewed []    Other Reviewed [x]    Records Requested []      Patient or patient representative verbalized consent for the use of Ambient Listening during the visit with  Kourtney Lynn DNP, APRN for chart documentation. 4/16/2025  08:42 EDT      Kourtney Lynn DNP, APRN

## 2025-04-23 ENCOUNTER — RESULTS FOLLOW-UP (OUTPATIENT)
Dept: NEUROLOGY | Facility: CLINIC | Age: 70
End: 2025-04-23
Payer: MEDICARE

## 2025-04-23 LAB
ACHR BIND AB SER-SCNC: <0.07 NMOL/L (ref 0–0.24)
ACHR BLOCK AB SER-ACNC: 23 % (ref 0–25)
ACHR MOD AB SER QL FC: 0 % (ref 0–45)

## 2025-06-12 ENCOUNTER — TRANSCRIBE ORDERS (OUTPATIENT)
Dept: GENERAL RADIOLOGY | Facility: HOSPITAL | Age: 70
End: 2025-06-12
Payer: MEDICARE

## 2025-06-12 ENCOUNTER — HOSPITAL ENCOUNTER (OUTPATIENT)
Dept: GENERAL RADIOLOGY | Facility: HOSPITAL | Age: 70
Discharge: HOME | End: 2025-06-12
Admitting: FAMILY MEDICINE
Payer: MEDICARE

## 2025-06-12 DIAGNOSIS — M54.6: ICD-10-CM

## 2025-06-12 DIAGNOSIS — G89.29 CHRONIC BILATERAL LOW BACK PAIN WITHOUT SCIATICA: Primary | ICD-10-CM

## 2025-06-12 DIAGNOSIS — M54.50 CHRONIC BILATERAL LOW BACK PAIN WITHOUT SCIATICA: Primary | ICD-10-CM

## 2025-06-12 DIAGNOSIS — G89.29: ICD-10-CM

## 2025-06-12 DIAGNOSIS — G89.29 CHRONIC BILATERAL LOW BACK PAIN WITHOUT SCIATICA: ICD-10-CM

## 2025-06-12 DIAGNOSIS — M54.50 CHRONIC BILATERAL LOW BACK PAIN WITHOUT SCIATICA: ICD-10-CM

## 2025-06-12 PROCEDURE — 72110 X-RAY EXAM L-2 SPINE 4/>VWS: CPT

## 2025-06-12 PROCEDURE — 72072 X-RAY EXAM THORAC SPINE 3VWS: CPT

## 2025-08-08 DIAGNOSIS — G25.81 RESTLESS LEGS SYNDROME (RLS): ICD-10-CM

## 2025-08-08 RX ORDER — ROPINIROLE 0.25 MG/1
TABLET, FILM COATED ORAL
Qty: 90 TABLET | Refills: 0 | Status: SHIPPED | OUTPATIENT
Start: 2025-08-08

## (undated) DEVICE — CVR HNDL LT SURG ACCSSRY BLU STRL

## (undated) DEVICE — CANNULATED DRILL

## (undated) DEVICE — THREADED GUIDE WIRE

## (undated) DEVICE — OVERDRILL AO, DIA2.7MM X 122MM: Brand: VARIAX

## (undated) DEVICE — GLV SURG TRIUMPH ORTHO W/ALOE PF LTX 8 STRL

## (undated) DEVICE — UNDERCAST PADDING: Brand: DEROYAL

## (undated) DEVICE — PAD ARMBRD SURG CONVOL 7.5X20X2IN

## (undated) DEVICE — DRSNG GZ PETROLTM XEROFORM CURAD 1X8IN STRL

## (undated) DEVICE — PUMP PAIN AUTOFUSER AUTO SELCT NOBOLUS 1TO14ML/HR 550ML DISP

## (undated) DEVICE — PAD CAST SOF ROL NS 6IN

## (undated) DEVICE — SPNG GZ WOVN 4X4IN 12PLY 10/BX STRL

## (undated) DEVICE — PK EXTREM LOWR 10

## (undated) DEVICE — SUCTION CANISTER, 2500CC, RIGID: Brand: DEROYAL

## (undated) DEVICE — BNDG ELAS W/CLIP 6IN 10YD LF STRL

## (undated) DEVICE — GLV SURG SIGNATURE TOUCH PF LTX 8 STRL BX/50

## (undated) DEVICE — DRILL BIT, AO DIA2.0MM X 135MM, SCALED: Brand: VARIAX

## (undated) DEVICE — OCCLUSIVE GAUZE PATCH,3% BISMUTH TRIBROMOPHENATE IN PETROLATUM BLEND: Brand: XEROFORM

## (undated) DEVICE — DRSNG GZ PETROLTM XEROFORM CURAD 4X4IN STRL

## (undated) DEVICE — ANTIBACTERIAL UNDYED BRAIDED (POLYGLACTIN 910), SYNTHETIC ABSORBABLE SUTURE: Brand: COATED VICRYL

## (undated) DEVICE — PAD CAST SOF ROL NS 4IN

## (undated) DEVICE — OVERDRILL AO, DIA3.5MM X 122MM: Brand: VARIAX

## (undated) DEVICE — DRILL BIT, AO DIA2.6MM X 135MM, SCALED: Brand: VARIAX

## (undated) DEVICE — 3M™ WARMING BLANKET, UPPER BODY, 10 PER CASE, 42268: Brand: BAIR HUGGER™

## (undated) DEVICE — DRAPE,REIN 53X77,STERILE: Brand: MEDLINE

## (undated) DEVICE — SUT ETHLN 3/0 PC5 18IN 1893G

## (undated) DEVICE — SNAP KOVER: Brand: UNBRANDED